# Patient Record
Sex: MALE | Race: WHITE | NOT HISPANIC OR LATINO | URBAN - NONMETROPOLITAN AREA
[De-identification: names, ages, dates, MRNs, and addresses within clinical notes are randomized per-mention and may not be internally consistent; named-entity substitution may affect disease eponyms.]

---

## 2023-10-06 ENCOUNTER — HOSPITAL ENCOUNTER (OUTPATIENT)
Dept: LAB | Facility: MEDICAL CENTER | Age: 63
End: 2023-10-06
Attending: STUDENT IN AN ORGANIZED HEALTH CARE EDUCATION/TRAINING PROGRAM
Payer: COMMERCIAL

## 2023-10-06 LAB
25(OH)D3 SERPL-MCNC: 66 NG/ML (ref 30–100)
ALBUMIN SERPL BCP-MCNC: 4.2 G/DL (ref 3.2–4.9)
ALP SERPL-CCNC: 104 U/L (ref 30–99)
ALT SERPL-CCNC: 21 U/L (ref 2–50)
AST SERPL-CCNC: 27 U/L (ref 12–45)
BASOPHILS # BLD AUTO: 0.9 % (ref 0–1.8)
BASOPHILS # BLD: 0.06 K/UL (ref 0–0.12)
BILIRUB CONJ SERPL-MCNC: <0.2 MG/DL (ref 0.1–0.5)
BILIRUB INDIRECT SERPL-MCNC: ABNORMAL MG/DL (ref 0–1)
BILIRUB SERPL-MCNC: 0.4 MG/DL (ref 0.1–1.5)
EOSINOPHIL # BLD AUTO: 0.09 K/UL (ref 0–0.51)
EOSINOPHIL NFR BLD: 1.3 % (ref 0–6.9)
ERYTHROCYTE [DISTWIDTH] IN BLOOD BY AUTOMATED COUNT: 49.4 FL (ref 35.9–50)
HCT VFR BLD AUTO: 46.4 % (ref 42–52)
HGB BLD-MCNC: 15 G/DL (ref 14–18)
IMM GRANULOCYTES # BLD AUTO: 0.04 K/UL (ref 0–0.11)
IMM GRANULOCYTES NFR BLD AUTO: 0.6 % (ref 0–0.9)
LYMPHOCYTES # BLD AUTO: 1.21 K/UL (ref 1–4.8)
LYMPHOCYTES NFR BLD: 17.6 % (ref 22–41)
MCH RBC QN AUTO: 30.1 PG (ref 27–33)
MCHC RBC AUTO-ENTMCNC: 32.3 G/DL (ref 32.3–36.5)
MCV RBC AUTO: 93.2 FL (ref 81.4–97.8)
MONOCYTES # BLD AUTO: 0.79 K/UL (ref 0–0.85)
MONOCYTES NFR BLD AUTO: 11.5 % (ref 0–13.4)
NEUTROPHILS # BLD AUTO: 4.7 K/UL (ref 1.82–7.42)
NEUTROPHILS NFR BLD: 68.1 % (ref 44–72)
NRBC # BLD AUTO: 0 K/UL
NRBC BLD-RTO: 0 /100 WBC (ref 0–0.2)
PLATELET # BLD AUTO: 241 K/UL (ref 164–446)
PMV BLD AUTO: 12.3 FL (ref 9–12.9)
PROT SERPL-MCNC: 7.2 G/DL (ref 6–8.2)
RBC # BLD AUTO: 4.98 M/UL (ref 4.7–6.1)
WBC # BLD AUTO: 6.9 K/UL (ref 4.8–10.8)

## 2023-10-06 PROCEDURE — 82784 ASSAY IGA/IGD/IGG/IGM EACH: CPT

## 2023-10-06 PROCEDURE — 86360 T CELL ABSOLUTE COUNT/RATIO: CPT

## 2023-10-06 PROCEDURE — 80076 HEPATIC FUNCTION PANEL: CPT

## 2023-10-06 PROCEDURE — 86357 NK CELLS TOTAL COUNT: CPT

## 2023-10-06 PROCEDURE — 82306 VITAMIN D 25 HYDROXY: CPT

## 2023-10-06 PROCEDURE — 85025 COMPLETE CBC W/AUTO DIFF WBC: CPT

## 2023-10-06 PROCEDURE — 36415 COLL VENOUS BLD VENIPUNCTURE: CPT

## 2023-10-06 PROCEDURE — 86359 T CELLS TOTAL COUNT: CPT

## 2023-10-06 PROCEDURE — 86355 B CELLS TOTAL COUNT: CPT

## 2023-10-08 LAB
ANNOTATION COMMENT IMP: ABNORMAL
CD19 CELLS NFR SPEC: 0 % (ref 6–23)
CD3 CELLS # BLD: 1076 CELLS/UL (ref 570–2400)
CD3 CELLS NFR SPEC: 75 % (ref 62–87)
CD3+CD4+ CELLS # BLD: 945 CELLS/UL (ref 430–1800)
CD3+CD4+ CELLS NFR BLD: 65 % (ref 32–64)
CD3+CD4+ CELLS/CD3+CD8+ CLL BLD: 7.22 RATIO (ref 0.8–3.9)
CD3+CD8+ CELLS # BLD: 126 CELLS/UL (ref 210–1200)
CD3+CD8+ CELLS NFR SPEC: 9 % (ref 15–46)
CD3-CD16+CD56+ CELLS # SPEC: 360 CELLS/UL (ref 78–470)
CD3-CD16+CD56+ CELLS NFR SPEC: 25 % (ref 4–26)
CELLS.CD3-CD19+ [#/VOLUME] IN BLOOD: 0 CELLS/UL (ref 91–610)
IGA SERPL-MCNC: 385 MG/DL (ref 68–408)
IGG SERPL-MCNC: 1113 MG/DL (ref 768–1632)
IGM SERPL-MCNC: 60 MG/DL (ref 35–263)

## 2023-10-30 SDOH — ECONOMIC STABILITY: TRANSPORTATION INSECURITY
IN THE PAST 12 MONTHS, HAS THE LACK OF TRANSPORTATION KEPT YOU FROM MEDICAL APPOINTMENTS OR FROM GETTING MEDICATIONS?: NO

## 2023-10-30 SDOH — ECONOMIC STABILITY: TRANSPORTATION INSECURITY
IN THE PAST 12 MONTHS, HAS LACK OF TRANSPORTATION KEPT YOU FROM MEETINGS, WORK, OR FROM GETTING THINGS NEEDED FOR DAILY LIVING?: NO

## 2023-10-30 SDOH — ECONOMIC STABILITY: FOOD INSECURITY: WITHIN THE PAST 12 MONTHS, YOU WORRIED THAT YOUR FOOD WOULD RUN OUT BEFORE YOU GOT MONEY TO BUY MORE.: NEVER TRUE

## 2023-10-30 SDOH — ECONOMIC STABILITY: FOOD INSECURITY: WITHIN THE PAST 12 MONTHS, THE FOOD YOU BOUGHT JUST DIDN'T LAST AND YOU DIDN'T HAVE MONEY TO GET MORE.: NEVER TRUE

## 2023-10-30 SDOH — HEALTH STABILITY: MENTAL HEALTH
STRESS IS WHEN SOMEONE FEELS TENSE, NERVOUS, ANXIOUS, OR CAN'T SLEEP AT NIGHT BECAUSE THEIR MIND IS TROUBLED. HOW STRESSED ARE YOU?: TO SOME EXTENT

## 2023-10-30 SDOH — HEALTH STABILITY: PHYSICAL HEALTH: ON AVERAGE, HOW MANY DAYS PER WEEK DO YOU ENGAGE IN MODERATE TO STRENUOUS EXERCISE (LIKE A BRISK WALK)?: 0 DAYS

## 2023-10-30 SDOH — ECONOMIC STABILITY: HOUSING INSECURITY
IN THE LAST 12 MONTHS, WAS THERE A TIME WHEN YOU DID NOT HAVE A STEADY PLACE TO SLEEP OR SLEPT IN A SHELTER (INCLUDING NOW)?: NO

## 2023-10-30 SDOH — HEALTH STABILITY: PHYSICAL HEALTH: ON AVERAGE, HOW MANY MINUTES DO YOU ENGAGE IN EXERCISE AT THIS LEVEL?: 0 MIN

## 2023-10-30 SDOH — ECONOMIC STABILITY: TRANSPORTATION INSECURITY
IN THE PAST 12 MONTHS, HAS LACK OF RELIABLE TRANSPORTATION KEPT YOU FROM MEDICAL APPOINTMENTS, MEETINGS, WORK OR FROM GETTING THINGS NEEDED FOR DAILY LIVING?: NO

## 2023-10-30 SDOH — ECONOMIC STABILITY: INCOME INSECURITY: HOW HARD IS IT FOR YOU TO PAY FOR THE VERY BASICS LIKE FOOD, HOUSING, MEDICAL CARE, AND HEATING?: NOT HARD AT ALL

## 2023-10-30 SDOH — ECONOMIC STABILITY: INCOME INSECURITY: IN THE LAST 12 MONTHS, WAS THERE A TIME WHEN YOU WERE NOT ABLE TO PAY THE MORTGAGE OR RENT ON TIME?: NO

## 2023-10-30 SDOH — ECONOMIC STABILITY: HOUSING INSECURITY: IN THE LAST 12 MONTHS, HOW MANY PLACES HAVE YOU LIVED?: 2

## 2023-10-30 ASSESSMENT — SOCIAL DETERMINANTS OF HEALTH (SDOH)
HOW MANY DRINKS CONTAINING ALCOHOL DO YOU HAVE ON A TYPICAL DAY WHEN YOU ARE DRINKING: PATIENT DOES NOT DRINK
IN A TYPICAL WEEK, HOW MANY TIMES DO YOU TALK ON THE PHONE WITH FAMILY, FRIENDS, OR NEIGHBORS?: MORE THAN THREE TIMES A WEEK
HOW OFTEN DO YOU ATTENT MEETINGS OF THE CLUB OR ORGANIZATION YOU BELONG TO?: NEVER
WITHIN THE PAST 12 MONTHS, YOU WORRIED THAT YOUR FOOD WOULD RUN OUT BEFORE YOU GOT THE MONEY TO BUY MORE: NEVER TRUE
HOW OFTEN DO YOU ATTENT MEETINGS OF THE CLUB OR ORGANIZATION YOU BELONG TO?: NEVER
HOW OFTEN DO YOU ATTEND CHURCH OR RELIGIOUS SERVICES?: NEVER
HOW OFTEN DO YOU ATTEND CHURCH OR RELIGIOUS SERVICES?: NEVER
IN A TYPICAL WEEK, HOW MANY TIMES DO YOU TALK ON THE PHONE WITH FAMILY, FRIENDS, OR NEIGHBORS?: MORE THAN THREE TIMES A WEEK
HOW OFTEN DO YOU HAVE A DRINK CONTAINING ALCOHOL: NEVER
DO YOU BELONG TO ANY CLUBS OR ORGANIZATIONS SUCH AS CHURCH GROUPS UNIONS, FRATERNAL OR ATHLETIC GROUPS, OR SCHOOL GROUPS?: NO
HOW HARD IS IT FOR YOU TO PAY FOR THE VERY BASICS LIKE FOOD, HOUSING, MEDICAL CARE, AND HEATING?: NOT HARD AT ALL
DO YOU BELONG TO ANY CLUBS OR ORGANIZATIONS SUCH AS CHURCH GROUPS UNIONS, FRATERNAL OR ATHLETIC GROUPS, OR SCHOOL GROUPS?: NO
HOW OFTEN DO YOU GET TOGETHER WITH FRIENDS OR RELATIVES?: ONCE A WEEK
HOW OFTEN DO YOU GET TOGETHER WITH FRIENDS OR RELATIVES?: ONCE A WEEK
HOW OFTEN DO YOU HAVE SIX OR MORE DRINKS ON ONE OCCASION: NEVER

## 2023-10-30 ASSESSMENT — LIFESTYLE VARIABLES
AUDIT-C TOTAL SCORE: 0
SKIP TO QUESTIONS 9-10: 1
HOW OFTEN DO YOU HAVE A DRINK CONTAINING ALCOHOL: NEVER
HOW MANY STANDARD DRINKS CONTAINING ALCOHOL DO YOU HAVE ON A TYPICAL DAY: PATIENT DOES NOT DRINK
HOW OFTEN DO YOU HAVE SIX OR MORE DRINKS ON ONE OCCASION: NEVER

## 2023-11-01 ENCOUNTER — OFFICE VISIT (OUTPATIENT)
Dept: MEDICAL GROUP | Facility: LAB | Age: 63
End: 2023-11-01
Payer: COMMERCIAL

## 2023-11-01 VITALS
BODY MASS INDEX: 30.61 KG/M2 | HEART RATE: 60 BPM | WEIGHT: 213.8 LBS | SYSTOLIC BLOOD PRESSURE: 126 MMHG | RESPIRATION RATE: 16 BRPM | OXYGEN SATURATION: 98 % | DIASTOLIC BLOOD PRESSURE: 58 MMHG | TEMPERATURE: 97.3 F | HEIGHT: 70 IN

## 2023-11-01 DIAGNOSIS — Z00.00 HEALTHCARE MAINTENANCE: ICD-10-CM

## 2023-11-01 DIAGNOSIS — Z12.11 COLON CANCER SCREENING: ICD-10-CM

## 2023-11-01 DIAGNOSIS — G47.01 INSOMNIA DUE TO MEDICAL CONDITION: ICD-10-CM

## 2023-11-01 DIAGNOSIS — E55.9 HYPOVITAMINOSIS D: ICD-10-CM

## 2023-11-01 DIAGNOSIS — R39.12 BENIGN PROSTATIC HYPERPLASIA WITH WEAK URINARY STREAM: ICD-10-CM

## 2023-11-01 DIAGNOSIS — N40.1 BENIGN PROSTATIC HYPERPLASIA WITH WEAK URINARY STREAM: ICD-10-CM

## 2023-11-01 DIAGNOSIS — G35 MULTIPLE SCLEROSIS (HCC): ICD-10-CM

## 2023-11-01 PROCEDURE — 3074F SYST BP LT 130 MM HG: CPT | Performed by: STUDENT IN AN ORGANIZED HEALTH CARE EDUCATION/TRAINING PROGRAM

## 2023-11-01 PROCEDURE — 99204 OFFICE O/P NEW MOD 45 MIN: CPT | Performed by: STUDENT IN AN ORGANIZED HEALTH CARE EDUCATION/TRAINING PROGRAM

## 2023-11-01 PROCEDURE — 3078F DIAST BP <80 MM HG: CPT | Performed by: STUDENT IN AN ORGANIZED HEALTH CARE EDUCATION/TRAINING PROGRAM

## 2023-11-01 RX ORDER — TAMSULOSIN HYDROCHLORIDE 0.4 MG/1
0.4 CAPSULE ORAL
Qty: 30 CAPSULE | Refills: 1 | Status: SHIPPED | OUTPATIENT
Start: 2023-11-01 | End: 2023-12-12

## 2023-11-01 RX ORDER — OFATUMUMAB 20 MG/.4ML
INJECTION, SOLUTION SUBCUTANEOUS
COMMUNITY

## 2023-11-01 RX ORDER — ERGOCALCIFEROL 1.25 MG/1
50000 CAPSULE ORAL
Qty: 12 CAPSULE | Refills: 2 | Status: SHIPPED | OUTPATIENT
Start: 2023-11-01

## 2023-11-01 ASSESSMENT — ENCOUNTER SYMPTOMS
FEVER: 0
CHILLS: 0
SHORTNESS OF BREATH: 0

## 2023-11-01 ASSESSMENT — FIBROSIS 4 INDEX: FIB4 SCORE: 1.49

## 2023-11-01 ASSESSMENT — PATIENT HEALTH QUESTIONNAIRE - PHQ9: CLINICAL INTERPRETATION OF PHQ2 SCORE: 0

## 2023-11-01 NOTE — LETTER
[employer]  Yonas Chou D.O.  [address] [facility] [address]  [place] [address]  Fax: [phone]   Authorization for Release/Disclosure of   Protected Health Information   Name: AMINAH MCKAY : 1960 SSN: [id number]   Address: [address] [place] [address] [address] [place] [address] Phone:    [phone] (home)    I authorize the entity listed below to release/disclose the PHI below to:   [facility]/Yonas Chou D.O. and Yonas Chou D.O.   Provider or Entity Name:  Dr. Jf Mccrary MD   Address   [facility]  [address] [place] [address] Phone:  [phone]    Fax:  [phone]   Reason for request: continuity of care   Information to be released:    [  ] LAST COLONOSCOPY,  including any PATH REPORT and follow-up  [  ] LAST FIT/COLOGUARD RESULT [  ] LAST DEXA  [  ] LAST MAMMOGRAM  [  ] LAST PAP  [  ] LAST LABS [  ] RETINA EXAM REPORT  [  ] IMMUNIZATION RECORDS  [ XX ] Release all info: last MRI and notes      [  ] Check here and initial the line next to each item to release ALL health information INCLUDING  _____ Care and treatment for drug and / or alcohol abuse  _____ HIV testing, infection status, or AIDS  _____ Genetic Testing    DATES OF SERVICE OR TIME PERIOD TO BE DISCLOSED: _____________  I understand and acknowledge that:  * This Authorization may be revoked at any time by you in writing, except if your health information has already been used or disclosed.  * Your health information that will be used or disclosed as a result of you signing this authorization could be re-disclosed by the recipient. If this occurs, your re-disclosed health information may no longer be protected by State or Federal laws.  * You may refuse to sign this Authorization. Your refusal will not affect your ability to obtain treatment.  * This Authorization becomes effective upon signing and will  on (date) __________.      If no date is indicated, this Authorization will  one (1) year from the signature  date.    Name: Brian Hogan  Signature:continuity of care Date:   11/1/2023     PLEASE FAX REQUESTED RECORDS BACK TO: [phone]

## 2023-11-01 NOTE — PROGRESS NOTES
"Subjective:     CC:  Diagnoses of Colon cancer screening, Multiple sclerosis (HCC), Hypovitaminosis D, Benign prostatic hyperplasia with weak urinary stream, Insomnia due to medical condition, and Healthcare maintenance were pertinent to this visit.    HISTORY OF THE PRESENT ILLNESS: Patient is a 63 y.o. male with the following medical problems   Past Medical History:   Diagnosis Date    Multiple serositis (HCC)      . This pleasant patient is here today to establish care and discuss the following;    Problem   Multiple Sclerosis (Hcc)    -Dx in 2015 when he had numbess in his lower half of his body  -the weakness started roughly in 2016 with progression  -he was using a cane then a walker and now mechanical wheelchair    -he is currently on kesimpta. He was following Dr. Mayra Mccrary in [facility]. He has tried 4 different medications in the past.     Hypovitaminosis D   Insomnia Due to Medical Condition    Likely related to bph         Current Outpatient Medications Ordered in Epic   Medication Sig Dispense Refill    Ofatumumab (KESIMPTA) 20 MG/0.4ML Solution Auto-injector Inject  under the skin.      vitamin D2, Ergocalciferol, (DRISDOL) 1.25 MG (21310 UT) Cap capsule Take 1 Capsule by mouth every 7 days. 12 Capsule 2    tamsulosin (FLOMAX) 0.4 MG capsule Take 1 Capsule by mouth 1/2 hour after breakfast. 30 Capsule 1     No current Epic-ordered facility-administered medications on file.         ROS:   Review of Systems   Constitutional:  Negative for chills and fever.   Respiratory:  Negative for shortness of breath.    Cardiovascular:  Negative for chest pain.         Objective:     Exam: /58 (BP Location: Right arm, Patient Position: Sitting, BP Cuff Size: Adult)   Pulse 60   Temp 36.3 °C (97.3 °F)   Resp 16   Ht 1.778 m (5' 10\")   Wt 97 kg (213 lb 12.8 oz)   SpO2 98%  Body mass index is 30.68 kg/m².    Physical Exam  Constitutional:       General: He is not in acute distress.     " Appearance: He is not ill-appearing.   Pulmonary:      Effort: Pulmonary effort is normal.   Neurological:      Mental Status: He is alert.   Psychiatric:         Mood and Affect: Mood normal.         Behavior: Behavior normal.         Thought Content: Thought content normal.         Judgment: Judgment normal.               Assessment & Plan:     Problem List Items Addressed This Visit       Hypovitaminosis D    Relevant Medications    vitamin D2, Ergocalciferol, (DRISDOL) 1.25 MG (27690 UT) Cap capsule    Insomnia due to medical condition     Chronic  -will try flomax to prevent nocturia          Multiple sclerosis (HCC)     Chronic  -refer to neurology          Relevant Medications    Ofatumumab (KESIMPTA) 20 MG/0.4ML Solution Auto-injector    Other Relevant Orders    Referral to Neurology     Other Visit Diagnoses       Colon cancer screening        Relevant Orders    Referral to GI for Colonoscopy    Benign prostatic hyperplasia with weak urinary stream        Relevant Medications    tamsulosin (FLOMAX) 0.4 MG capsule    Healthcare maintenance        Relevant Orders    Lipid Profile    HEMOGLOBIN A1C    TSH WITH REFLEX TO FT4    Comp Metabolic Panel            3 month annual     Please note that this dictation was created using voice recognition software. I have made every reasonable attempt to correct obvious errors, but I expect that there are errors of grammar and possibly content that I did not discover before finalizing the note.

## 2023-11-03 ENCOUNTER — TELEPHONE (OUTPATIENT)
Dept: SCHEDULING | Facility: IMAGING CENTER | Age: 63
End: 2023-11-03
Payer: COMMERCIAL

## 2023-11-03 NOTE — TELEPHONE ENCOUNTER
Isreal Vyas has a ref to Dr.Bloch    Patient Name: Brian Hogan  MRN:[id number]  Referral #:[id number]  Referred By:EMILIANO DALTON  Best Call Back #:[phone]    Thanks.

## 2023-12-09 DIAGNOSIS — R39.12 BENIGN PROSTATIC HYPERPLASIA WITH WEAK URINARY STREAM: ICD-10-CM

## 2023-12-09 DIAGNOSIS — N40.1 BENIGN PROSTATIC HYPERPLASIA WITH WEAK URINARY STREAM: ICD-10-CM

## 2023-12-12 RX ORDER — TAMSULOSIN HYDROCHLORIDE 0.4 MG/1
0.4 CAPSULE ORAL
Qty: 90 CAPSULE | Refills: 1 | Status: SHIPPED | OUTPATIENT
Start: 2023-12-12 | End: 2024-02-01 | Stop reason: SDUPTHER

## 2024-01-17 ENCOUNTER — OFFICE VISIT (OUTPATIENT)
Dept: NEUROLOGY | Facility: MEDICAL CENTER | Age: 64
End: 2024-01-17
Attending: PSYCHIATRY & NEUROLOGY
Payer: COMMERCIAL

## 2024-01-17 VITALS
BODY MASS INDEX: 30.06 KG/M2 | DIASTOLIC BLOOD PRESSURE: 62 MMHG | WEIGHT: 210 LBS | HEART RATE: 60 BPM | HEIGHT: 70 IN | SYSTOLIC BLOOD PRESSURE: 110 MMHG | TEMPERATURE: 98 F | OXYGEN SATURATION: 98 %

## 2024-01-17 DIAGNOSIS — G35 MULTIPLE SCLEROSIS (HCC): Primary | ICD-10-CM

## 2024-01-17 PROCEDURE — 3078F DIAST BP <80 MM HG: CPT | Performed by: PSYCHIATRY & NEUROLOGY

## 2024-01-17 PROCEDURE — 99417 PROLNG OP E/M EACH 15 MIN: CPT | Performed by: PSYCHIATRY & NEUROLOGY

## 2024-01-17 PROCEDURE — 99211 OFF/OP EST MAY X REQ PHY/QHP: CPT | Performed by: PSYCHIATRY & NEUROLOGY

## 2024-01-17 PROCEDURE — 99205 OFFICE O/P NEW HI 60 MIN: CPT | Performed by: PSYCHIATRY & NEUROLOGY

## 2024-01-17 PROCEDURE — 3074F SYST BP LT 130 MM HG: CPT | Performed by: PSYCHIATRY & NEUROLOGY

## 2024-01-17 ASSESSMENT — PATIENT HEALTH QUESTIONNAIRE - PHQ9: CLINICAL INTERPRETATION OF PHQ2 SCORE: 0

## 2024-01-17 ASSESSMENT — FIBROSIS 4 INDEX: FIB4 SCORE: 1.49

## 2024-01-17 NOTE — PROGRESS NOTES
"[facility] NEUROLOGY  MULTIPLE SCLEROSIS & NEUROIMMUNOLOGY  NEW PATIENT VISIT    Referral source: Yonas GuamanDO    DISEASE SUMMARY:  Principal neurologic diagnosis: MS  Diagnosis of MS: 2016  Disease History:  - 2015: episode of numbness with a sensory level at the level of the diaphragm; resolved spontaneously after ~1 week  - 2016: second episode of numbness with same sensory level as previous, resolved spontaneously  - established w/ Dr. Yoo; workup included MRIs and LP; diagnosed w/ MS  - established w/ Dr. Lancaster (MS specialist)  - 2017: started Tecfidera;   - 2017: stopped Tecfidera due to perceived ineffectiveness (progression)  - 2018: started Ocrevus, treated x1 year  - treated w/ Acthar Gel  - 2019: transitioned to Tysabri due to \"no change or improvement\" on Ocrevus  - : required a cane  - Dr. Lancaster , eventually transitioned to Dr. Mayra Mccrary ([facility])  - : started using a walker occasionally  - : transitioned to Kesimpta due to \"no change or improvement\" on Tysabri  - : got a power chair due to fatigability while walking with walker  Disease course at onset: ?relapsing or progressive  Current disease course: progressive (?primary or secondary) without activity without worsening  Previous disease therapies:  - Tecfidera:   Current disease therapies:  - Kesimpta: tolerates this well  Symptomatic therapies:  - Ampyra: tried this briefly without noticeable benefit, longer courses never approved by insurance  - Flomax: minimally helpful for nocturia  CSF ():  - reportedly consistent with MS  Other Testing:  -   MRI head:  - 2023: \"unchanged white matter lesions... no new lesion... no lesions enhance...\"  - 2022:   MRI cervical spine:  -   MRI thoracic spine:  - 2023: \"unchanged cord lesions... no new lesion...\"  - 2022:   Immunizations:  - influenza?:   - Pneumonia?:   - SARS-CoV-2?: original Moderna in   Cancer Screens:  - mammogram:   - PAP?:   - " "skin check:     CC: \"MS\"    HISTORY OF ILLNESS:  Brian Hogan is a 63 y.o. man with a history most notable for MS.  Today, he was accompanied by his wife (Etta), and he provided the following history:    I have summarized pertinent data above.    A review of MS-related symptoms was notable for the following:    Fatigue: yes; snoring has improved with weight loss, no apneic episodes per wife  Weakness: yes; lower extremities and RUE  Numbness: yes; involving the ulnar distribution of RUE  Incoordination: no  Spasms/Spasticity: yes; in the lower extremities, symptoms are not so severe that he would consider medication  Vision Impairment: yes; related to \"old age\"  Walking/Balance Problems: yes; discussed above  Neuralgia: no  Bowel Symptoms: no  Bladder Symptoms: yes; frequency: every 3-4 hours, urgency: yes, hesitancy: no, incomplete emptying: yes, incontinence: no, has never seen a urologist  Heat Sensitivity: yes; lower extremity weakness  Depression: no  Cognitive/Memory Problems: no  Sexual Dysfunction:  not assessed  Anxiety: no    MEDICAL AND SURGICAL HISTORY:  Past Medical History:   Diagnosis Date    Multiple serositis (HCC)      History reviewed. No pertinent surgical history.  MEDICATIONS:  Current Outpatient Medications   Medication Sig    tamsulosin (FLOMAX) 0.4 MG capsule TAKE 1 CAPSULE BY MOUTH 30 MINUTES AFTER BREAKFAST    Ofatumumab (KESIMPTA) 20 MG/0.4ML Solution Auto-injector Inject  under the skin.    vitamin D2, Ergocalciferol, (DRISDOL) 1.25 MG (19299 UT) Cap capsule Take 1 Capsule by mouth every 7 days.     SOCIAL HISTORY:  Social History     Tobacco Use    Smoking status: Never    Smokeless tobacco: Never   Substance Use Topics    Alcohol use: Not Currently     Social History     Social History Narrative    Not on file     FAMILY HISTORY:  Family History   Problem Relation Age of Onset    No Known Problems Mother     Heart Disease Father     Leukemia Father     Leukemia Brother      REVIEW " OF SYSTEMS:  A ROS was completed.  Pertinent positives and negatives were included in the HPI, above.  All other systems were reviewed and are negative.    PHYSICAL EXAM:  General/Medical:  - NAD    Neuro:  MENTAL STATUS: awake and alert; no deficits of speech or language; oriented to conversation; affect was appropriate to situation; pleasant, cooperative    CRANIAL NERVES:    II: acuity: J2/J7 without glasses, fields: intact to confrontation, pupils: 5/5 to 3/3 without a relative afferent pupillary defect, discs: sharp, no red desaturation noted    III/IV/VI: versions: intact without nystagmus    V: facial sensation: symmetric to light touch    VII: facial expression: symmetric    VIII: hearing: intact to finger rub    IX/X: palate: elevates symmetrically    XI: shoulder shrug: symmetric    XII: tongue: midline    MOTOR:  - bulk: normal throughout  - tone: NT  Upper Extremity Strength  (R/L)    5/5   Elbow flexion 5/5   Elbow extension 5/5   Shoulder abduction 5/5     Lower Extremity Strength  (R/L)   Hip flexion <3/<3+   Knee extension 4/4+   Knee flexion NT   Ankle dorsiflexion <3/4   Ankle plantarflexion NT     - heel-walk: NT  - toe-walk: NT  - pronator drift: absent  - abnormal movements: none    SENSATION:  - light touch: reduced in the RUE (respecting an ulnar nerve distribution) and the BLEs circumferentially  - vibration (R/L, seconds): 0/0 at the great toes  - pinprick: NT  - proprioception: NT  - Romberg: NT    COORDINATION:  - finger to nose: normal, no ataxia on exam  - finger tapping: rapid and accurate, bilaterally    REFLEXES:  Reflex Right Left   BR 1+ 2+   Biceps 1+ 2+   Triceps NT NT   Patellae 2+ 2+   Achilles 1+ 1+   Toes up up     GAIT:  - walker  - slow  - heel-walk: NT  - toe-walk: NT  - tandem gait: NT    QUANTITATIVE SCORES:  Timed 25-foot walk (sec): 17 on 1/17/2024.  Assistive device: none    REVIEW OF IMAGING STUDIES:  I have summarized available results above.  I have asked my  "staff to obtain copies of prior images.    REVIEW OF LABORATORY STUDIES:  I have summarized pertinent data above.    ASSESSMENT:  Brian Hogan is a 63 y.o. man with progressive MS without activity with worsening.  Plans/recommendations as follows:    PLAN:  Progressive MS Without Activity With Worsening:  - continue Kesimpta  - Kesimpta pre-treatment labs (for the sake of thoroughness)  - Prevnar 20 vaccination  - NCS BUEs (lower-motor neuron findings in the RUE)  - will discuss Ampyra at the next visit    Follow-Up:  - Return in about 4 months (around 5/17/2024).    Signed: Deandre Hull M.D.    BILLING DOCUMENTATION:   I spent 90 minutes reviewing the medical record, interviewing and examining the patient, discussing my impression (see \"assessment\" above), and coordinating care.  "

## 2024-01-23 ENCOUNTER — HOSPITAL ENCOUNTER (OUTPATIENT)
Dept: LAB | Facility: MEDICAL CENTER | Age: 64
End: 2024-01-23
Attending: PSYCHIATRY & NEUROLOGY
Payer: COMMERCIAL

## 2024-01-23 ENCOUNTER — HOSPITAL ENCOUNTER (OUTPATIENT)
Dept: LAB | Facility: MEDICAL CENTER | Age: 64
End: 2024-01-23
Attending: STUDENT IN AN ORGANIZED HEALTH CARE EDUCATION/TRAINING PROGRAM
Payer: COMMERCIAL

## 2024-01-23 DIAGNOSIS — Z00.00 HEALTHCARE MAINTENANCE: ICD-10-CM

## 2024-01-23 DIAGNOSIS — G35 MULTIPLE SCLEROSIS (HCC): ICD-10-CM

## 2024-01-23 LAB
ALBUMIN SERPL BCP-MCNC: 4.2 G/DL (ref 3.2–4.9)
ALBUMIN/GLOB SERPL: 1.3 G/DL
ALP SERPL-CCNC: 83 U/L (ref 30–99)
ALT SERPL-CCNC: 15 U/L (ref 2–50)
ANION GAP SERPL CALC-SCNC: 9 MMOL/L (ref 7–16)
AST SERPL-CCNC: 19 U/L (ref 12–45)
BILIRUB SERPL-MCNC: 0.3 MG/DL (ref 0.1–1.5)
BUN SERPL-MCNC: 13 MG/DL (ref 8–22)
CALCIUM ALBUM COR SERPL-MCNC: 8.6 MG/DL (ref 8.5–10.5)
CALCIUM SERPL-MCNC: 8.8 MG/DL (ref 8.5–10.5)
CHLORIDE SERPL-SCNC: 101 MMOL/L (ref 96–112)
CHOLEST SERPL-MCNC: 304 MG/DL (ref 100–199)
CO2 SERPL-SCNC: 27 MMOL/L (ref 20–33)
CREAT SERPL-MCNC: 0.81 MG/DL (ref 0.5–1.4)
EST. AVERAGE GLUCOSE BLD GHB EST-MCNC: 120 MG/DL
FASTING STATUS PATIENT QL REPORTED: NORMAL
GFR SERPLBLD CREATININE-BSD FMLA CKD-EPI: 100 ML/MIN/1.73 M 2
GLOBULIN SER CALC-MCNC: 3.2 G/DL (ref 1.9–3.5)
GLUCOSE SERPL-MCNC: 95 MG/DL (ref 65–99)
HBA1C MFR BLD: 5.8 % (ref 4–5.6)
HBV SURFACE AB SERPL IA-ACNC: <3.5 MIU/ML (ref 0–10)
HBV SURFACE AG SER QL: NORMAL
HCV AB SER QL: NORMAL
HDLC SERPL-MCNC: 52 MG/DL
LDLC SERPL CALC-MCNC: 238 MG/DL
POTASSIUM SERPL-SCNC: 4 MMOL/L (ref 3.6–5.5)
PROT SERPL-MCNC: 7.4 G/DL (ref 6–8.2)
SODIUM SERPL-SCNC: 137 MMOL/L (ref 135–145)
TRIGL SERPL-MCNC: 69 MG/DL (ref 0–149)
TSH SERPL DL<=0.005 MIU/L-ACNC: 2.48 UIU/ML (ref 0.38–5.33)

## 2024-01-23 PROCEDURE — 83036 HEMOGLOBIN GLYCOSYLATED A1C: CPT

## 2024-01-23 PROCEDURE — 84443 ASSAY THYROID STIM HORMONE: CPT

## 2024-01-23 PROCEDURE — 86706 HEP B SURFACE ANTIBODY: CPT

## 2024-01-23 PROCEDURE — 86480 TB TEST CELL IMMUN MEASURE: CPT

## 2024-01-23 PROCEDURE — 80061 LIPID PANEL: CPT

## 2024-01-23 PROCEDURE — 87340 HEPATITIS B SURFACE AG IA: CPT

## 2024-01-23 PROCEDURE — 86787 VARICELLA-ZOSTER ANTIBODY: CPT

## 2024-01-23 PROCEDURE — 86803 HEPATITIS C AB TEST: CPT

## 2024-01-23 PROCEDURE — 36415 COLL VENOUS BLD VENIPUNCTURE: CPT

## 2024-01-23 PROCEDURE — 80053 COMPREHEN METABOLIC PANEL: CPT

## 2024-01-25 LAB
GAMMA INTERFERON BACKGROUND BLD IA-ACNC: 0.03 IU/ML
M TB IFN-G BLD-IMP: NEGATIVE
M TB IFN-G CD4+ BCKGRND COR BLD-ACNC: 0 IU/ML
MITOGEN IGNF BCKGRD COR BLD-ACNC: 4.4 IU/ML
QFT TB2 - NIL TBQ2: 0 IU/ML
VZV IGG SER IA-ACNC: 2427 IV

## 2024-02-01 ENCOUNTER — OFFICE VISIT (OUTPATIENT)
Dept: MEDICAL GROUP | Facility: LAB | Age: 64
End: 2024-02-01
Payer: COMMERCIAL

## 2024-02-01 VITALS
WEIGHT: 210 LBS | SYSTOLIC BLOOD PRESSURE: 118 MMHG | TEMPERATURE: 97.5 F | OXYGEN SATURATION: 94 % | HEIGHT: 70 IN | HEART RATE: 64 BPM | DIASTOLIC BLOOD PRESSURE: 62 MMHG | RESPIRATION RATE: 16 BRPM | BODY MASS INDEX: 30.06 KG/M2

## 2024-02-01 DIAGNOSIS — R39.12 BENIGN PROSTATIC HYPERPLASIA WITH WEAK URINARY STREAM: ICD-10-CM

## 2024-02-01 DIAGNOSIS — E78.5 DYSLIPIDEMIA: ICD-10-CM

## 2024-02-01 DIAGNOSIS — N40.1 BENIGN PROSTATIC HYPERPLASIA WITH WEAK URINARY STREAM: ICD-10-CM

## 2024-02-01 PROCEDURE — 3078F DIAST BP <80 MM HG: CPT | Performed by: STUDENT IN AN ORGANIZED HEALTH CARE EDUCATION/TRAINING PROGRAM

## 2024-02-01 PROCEDURE — 99396 PREV VISIT EST AGE 40-64: CPT | Performed by: STUDENT IN AN ORGANIZED HEALTH CARE EDUCATION/TRAINING PROGRAM

## 2024-02-01 PROCEDURE — 3074F SYST BP LT 130 MM HG: CPT | Performed by: STUDENT IN AN ORGANIZED HEALTH CARE EDUCATION/TRAINING PROGRAM

## 2024-02-01 RX ORDER — TAMSULOSIN HYDROCHLORIDE 0.4 MG/1
0.4 CAPSULE ORAL
Qty: 90 CAPSULE | Refills: 1 | Status: SHIPPED | OUTPATIENT
Start: 2024-02-01

## 2024-02-01 RX ORDER — ROSUVASTATIN CALCIUM 5 MG/1
5 TABLET, COATED ORAL EVERY EVENING
Qty: 30 TABLET | Refills: 1 | Status: SHIPPED | OUTPATIENT
Start: 2024-02-01 | End: 2024-03-06

## 2024-02-01 ASSESSMENT — ENCOUNTER SYMPTOMS
SHORTNESS OF BREATH: 0
FEVER: 0
CHILLS: 0

## 2024-02-01 ASSESSMENT — FIBROSIS 4 INDEX: FIB4 SCORE: 1.24

## 2024-02-01 NOTE — PROGRESS NOTES
Subjective:     Subjective:     CC:   Chief Complaint   Patient presents with    Annual Exam     Review labs       HPI:   Brian Hogan is a 63 y.o. male who presents for annual exam    Last Colorectal Cancer Screening: ordered previously   Last Tdap: unable to recall  Received HPV series: No  Hx STDs: No    Exercise: 30 minutes of exercise daily   Diet: home cooked meals, with fruits and vegetables     He  has a past medical history of Multiple serositis (HCC).  He  has no past surgical history on file.    Family History   Problem Relation Age of Onset    No Known Problems Mother     Heart Disease Father     Leukemia Father     Leukemia Brother      Social History     Tobacco Use    Smoking status: Never    Smokeless tobacco: Never   Vaping Use    Vaping Use: Never used   Substance Use Topics    Alcohol use: Not Currently    Drug use: Never     He  has no history on file for sexual activity.    Patient Active Problem List    Diagnosis Date Noted    Dyslipidemia 02/01/2024    Multiple sclerosis (HCC) 11/01/2023    Hypovitaminosis D 11/01/2023    Insomnia due to medical condition 11/01/2023     Current Outpatient Medications   Medication Sig Dispense Refill    rosuvastatin (CRESTOR) 5 MG Tab Take 1 Tablet by mouth every evening. 30 Tablet 1    tamsulosin (FLOMAX) 0.4 MG capsule Take 1 Capsule by mouth 1/2 hour after breakfast. 90 Capsule 1    Ofatumumab (KESIMPTA) 20 MG/0.4ML Solution Auto-injector Inject  under the skin.      vitamin D2, Ergocalciferol, (DRISDOL) 1.25 MG (52941 UT) Cap capsule Take 1 Capsule by mouth every 7 days. 12 Capsule 2     No current facility-administered medications for this visit.     Not on File    Review of Systems   Review of Systems   Constitutional:  Negative for chills and fever.   Respiratory:  Negative for shortness of breath.    Cardiovascular:  Negative for chest pain.        Objective:   /62 (BP Location: Left arm, Patient Position: Sitting, BP Cuff Size: Adult)   Pulse  "64   Temp 36.4 °C (97.5 °F)   Resp 16   Ht 1.778 m (5' 10\")   Wt 95.3 kg (210 lb)   SpO2 94%   BMI 30.13 kg/m²      Wt Readings from Last 4 Encounters:   02/01/24 95.3 kg (210 lb)   01/17/24 95.3 kg (210 lb)   11/01/23 97 kg (213 lb 12.8 oz)           Physical Exam:  Physical Exam  Constitutional:       General: He is not in acute distress.     Appearance: Normal appearance. He is not ill-appearing.   HENT:      Head: Normocephalic and atraumatic.      Right Ear: Tympanic membrane and ear canal normal.      Left Ear: Tympanic membrane and ear canal normal.      Mouth/Throat:      Mouth: Mucous membranes are moist.      Pharynx: Oropharynx is clear.   Eyes:      Extraocular Movements: Extraocular movements intact.      Pupils: Pupils are equal, round, and reactive to light.   Neck:      Thyroid: No thyromegaly.   Cardiovascular:      Rate and Rhythm: Normal rate and regular rhythm.      Heart sounds: Normal heart sounds.   Pulmonary:      Effort: Pulmonary effort is normal.      Breath sounds: Normal breath sounds.   Abdominal:      General: Abdomen is flat. Bowel sounds are normal.      Palpations: Abdomen is soft.   Musculoskeletal:      Cervical back: Normal range of motion and neck supple.      Right lower leg: No edema.      Left lower leg: No edema.   Lymphadenopathy:      Cervical: No cervical adenopathy.   Skin:     General: Skin is warm and dry.   Neurological:      Mental Status: He is alert.   Psychiatric:         Mood and Affect: Mood normal.         Behavior: Behavior normal.         Thought Content: Thought content normal.         Judgment: Judgment normal.             Assessment and Plan:     Problem List Items Addressed This Visit       Dyslipidemia     New diagnosis   -will start low dose rosuvastatin per patient preference          Relevant Medications    rosuvastatin (CRESTOR) 5 MG Tab    Other Relevant Orders    Lipid Profile    Comp Metabolic Panel     Other Visit Diagnoses       Benign " prostatic hyperplasia with weak urinary stream        Relevant Medications    tamsulosin (FLOMAX) 0.4 MG capsule             Health maintenance:    Labs per orders  Immunizations per orders  Age-appropriate industry guidance discussed including diet and exercise  Discussed diet and exercise     Follow-up: June blood work f/u

## 2024-03-06 DIAGNOSIS — E78.5 DYSLIPIDEMIA: ICD-10-CM

## 2024-03-06 RX ORDER — ROSUVASTATIN CALCIUM 5 MG/1
5 TABLET, COATED ORAL EVERY EVENING
Qty: 90 TABLET | Refills: 2 | Status: SHIPPED | OUTPATIENT
Start: 2024-03-06

## 2024-03-06 NOTE — TELEPHONE ENCOUNTER
Received request via: Pharmacy    Was the patient seen in the last year in this department? Yes    Does the patient have an active prescription (recently filled or refills available) for medication(s) requested? No    Pharm[facility] Name: [facility]     Does the patient have detention Plus and need 100 day supply (blood pressure, diabetes and cholesterol meds only)? Patient does not have SCP

## 2024-04-23 ENCOUNTER — NON-PROVIDER VISIT (OUTPATIENT)
Dept: NEUROLOGY | Facility: MEDICAL CENTER | Age: 64
End: 2024-04-23
Attending: SPECIALIST
Payer: COMMERCIAL

## 2024-04-23 DIAGNOSIS — G35 MULTIPLE SCLEROSIS (HCC): Primary | ICD-10-CM

## 2024-04-23 PROCEDURE — 95908 NRV CNDJ TST 3-4 STUDIES: CPT | Mod: 26 | Performed by: SPECIALIST

## 2024-04-23 PROCEDURE — 95886 MUSC TEST DONE W/N TEST COMP: CPT | Mod: 26 | Performed by: SPECIALIST

## 2024-04-23 PROCEDURE — 95886 MUSC TEST DONE W/N TEST COMP: CPT | Performed by: SPECIALIST

## 2024-04-23 PROCEDURE — 95908 NRV CNDJ TST 3-4 STUDIES: CPT | Performed by: SPECIALIST

## 2024-04-23 NOTE — PROCEDURES
NERVE CONDUCTION STUDIES AND ELECTROMYOGRAPHY REPORT        04/23/24      Referring provider: Yonas Chou D.O.      SUMMARY OF PATIENT'S CLINICAL HISTORY,PHYSICAL EXAM, AND RATIONALE FOR TESTING:    Mr. Brian Hogan 63 y.o. presenting with right hand numbness.  The patient has primary progressive multiple sclerosis and is in an electric wheelchair.  He states that using an upper extremity exercise machine aggravates the numbness.    Past Medical History is significant for :   Past Medical History:   Diagnosis Date    Multiple serositis (HCC)        The electrodiagnostic studies were performed to evaluate for possible peripheral neuropathy versus radiculopathy.      ELECTRODIAGNOSTIC EXAMINATION:  Nerve conduction studies (NCS) and electromyography (EMG) are utilized to evaluate direct or indirect damage to the peripheral nervous system. NCS are performed to measure the nerve(s) response(s) to electrostimulation across a given nerve segment. EMG evaluates the passive and active electrical activity of the muscle(s) in question.  Muscles are innervated by specific peripheral nerves and roots. Often times, several nerves the muscle to be examined in order to determine the presence or absence of the disease process. Furthermore, nerves and muscles may need to be tested in a [id number] comparison, as well as in additional extremities, as this may be crucial in characterizing the extent of the disease process, which may be diffuse or isolated and of varying degree of severity. The extent of the neurodiagnostic exam is justified as it may help arrive to a proper diagnosis, which ultimately may contribute to better management of the patient. Therefore, the nerves to muscles examined during the study were medically necessary.    Unless otherwise noted, temperature of the extremity(s) study was monitored before and during the examination and remained between 32 and 36 degrees C for the upper extremities, and between 30  and 36 degrees C for the lower extremities.      NERVE CONDUCTION STUDIES:  Sensory nerves:   -Right median sensory nerve was examined.The response was within normal limits.  -Right ulnar sensory nerve was examined. The response was within normal limits.    Motor nerves:   -Right median motor nerve was examined. Recording electrodes placed at the Abductor Pollicis Brevis muscles. The response was abnormal.  Onset latency was prolonged to 4.4 ms, amplitude at the elbow was decreased to 1.6 mV.  Stimulation of the median nerve at the wrist produced a 7.8 mV response and stimulation of the ulnar nerve at the wrist induced to 5.8 mV response indicating the presence of a Misha-Jagdish anastomosis.  -Right ulnar motor nerve was examined. Recording electrodes placed at the Abductor Digiti Minimi muscles. The response was within normal limits.        ELECTROMYOGRAPHY:  The study was performed the concentric needle electrode. Fibrillation and fasciculation activity is graded by convention from none (0) to continuous (4+).  Needle electrode examination was performed in the following muscles: Right deltoid, biceps, triceps, first dorsal interosseous, abductor pollicis brevis.  The muscles tested demonstrated normal insertional activity, normal motor unit morphology and recruitment. There were no elements suggestive of active denervation.      Nerve Conduction Studies     Stim Site NR Onset (ms) Norm Onset (ms) O-P Amp (µV) Norm O-P Amp Site1 Site2 Delta-P (ms) Dist (cm) Samuel (m/s) Norm Samuel (m/s)   Right Median Anti Sensory (2nd Digit)   Wrist    3.0 <3.8 13.9 >10 Wrist 2nd Digit 4.3 14.0 *33 >50   Right Ulnar Anti Sensory (5th Digit)   Wrist    2.3 <3.2 11.7 >5 Wrist 5th Digit 3.2 14.0 *44 >50        Stim Site NR Onset (ms) Norm Onset (ms) O-P Amp (mV) Norm O-P Amp Site1 Site2 Delta-0 (ms) Dist (cm) Samuel (m/s) Norm Samuel (m/s)   Right Median Motor (Abd Poll Brev)   Wrist    *4.4 <4 7.8 >5 Elbow Wrist 4.8 25.0 52 >50   Elbow    9.2   1.6  Wrist (Ulnar) Elbow 4.9 0.0     Wrist (Ulnar)    4.3  5.8          Right Ulnar Motor (Abd Dig Min)   Wrist    2.7 <3.1 8.7 >7 B Elbow Wrist 4.0 22.0 55 >50   B Elbow    6.7  8.2  A Elbow B Elbow 1.2 10.0 83    A Elbow    7.9  8.2                        Electromyography     Side Muscle Nerve Root Ins Act Fibs Psw Amp Dur Poly Recrt Int Pat Comment   Right Deltoid Axillary C5-6 Nml Nml Nml Nml Nml 0 Nml Nml    Right Biceps Musculocut C5-6 Nml Nml Nml Nml Nml 0 Nml Nml    Right Triceps Radial C6-7-8 Nml Nml Nml Nml Nml 0 Nml Nml    Right 1stDorInt Ulnar C8-T1 Nml Nml Nml Nml Nml 0 Nml Nml    Right Abd Poll Brev Median C8-T1 Nml Nml Nml Nml Nml 0 Nml Nml            DIAGNOSTIC INTERPRETATION:   Extensive electrodiagnostic studies were performed to the right upper extremity.  The results are as follows:    1.  Right carpal tunnel syndrome which is mild to moderate and electrophysiologically and not associated with motor unit changes in median nerve supplied hand muscles.    2.  Misha-Jagdish anastomosis affecting the median nerve on the right.    3.  Normal right ulnar nerve motor and sensory conduction studies.    4.  No evidence of radiculopathy in selected muscles studied right upper extremity.            INES Cline M.D.(No note.)

## 2024-05-16 ENCOUNTER — TELEPHONE (OUTPATIENT)
Dept: NEUROLOGY | Facility: MEDICAL CENTER | Age: 64
End: 2024-05-16

## 2024-05-16 ENCOUNTER — OFFICE VISIT (OUTPATIENT)
Dept: NEUROLOGY | Facility: MEDICAL CENTER | Age: 64
End: 2024-05-16
Attending: PSYCHIATRY & NEUROLOGY
Payer: COMMERCIAL

## 2024-05-16 VITALS
HEIGHT: 70 IN | TEMPERATURE: 97.8 F | WEIGHT: 200 LBS | DIASTOLIC BLOOD PRESSURE: 60 MMHG | SYSTOLIC BLOOD PRESSURE: 112 MMHG | OXYGEN SATURATION: 96 % | BODY MASS INDEX: 28.63 KG/M2 | HEART RATE: 59 BPM

## 2024-05-16 DIAGNOSIS — G35 MULTIPLE SCLEROSIS (HCC): Primary | ICD-10-CM

## 2024-05-16 DIAGNOSIS — G56.01 CARPAL TUNNEL SYNDROME OF RIGHT WRIST: ICD-10-CM

## 2024-05-16 PROCEDURE — 99215 OFFICE O/P EST HI 40 MIN: CPT | Performed by: PSYCHIATRY & NEUROLOGY

## 2024-05-16 PROCEDURE — 3078F DIAST BP <80 MM HG: CPT | Performed by: PSYCHIATRY & NEUROLOGY

## 2024-05-16 PROCEDURE — 3074F SYST BP LT 130 MM HG: CPT | Performed by: PSYCHIATRY & NEUROLOGY

## 2024-05-16 RX ORDER — DALFAMPRIDINE 10 MG/1
10 TABLET, FILM COATED, EXTENDED RELEASE ORAL 2 TIMES DAILY
Qty: 60 TABLET | Refills: 5 | Status: SHIPPED | OUTPATIENT
Start: 2024-05-16 | End: 2024-06-15

## 2024-05-16 ASSESSMENT — FIBROSIS 4 INDEX: FIB4 SCORE: 1.24

## 2024-05-16 NOTE — TELEPHONE ENCOUNTER
Prior Authorization for Dalfampridine 10 MG TABLET SR 12 HR  (Quantity: 60, Days: 30) has been submitted via Cover My Meds: Key ([id number])    Insurance: [facility]    Will follow up in 24-48 business hours.

## 2024-05-16 NOTE — PROGRESS NOTES
"[facility] NEUROLOGY  MULTIPLE SCLEROSIS & NEUROIMMUNOLOGY  FOLLOW-UP VISIT    DISEASE SUMMARY:  Principal neurologic diagnosis: MS  Diagnosis of MS: 2016  Disease History:  - 2015: episode of numbness with a sensory level at the level of the diaphragm; resolved spontaneously after ~1 week  - 2016: second episode of numbness with same sensory level as previous, resolved spontaneously  - established w/ Dr. Yoo; workup included MRIs and LP; diagnosed w/ MS  - established w/ Dr. Lancaster (MS specialist)  - 2017: started Tecfidera;   - 2017: stopped Tecfidera due to perceived ineffectiveness (progression)  - 2018: started Ocrevus, treated x1 year  - treated w/ Acthar Gel  - 2019: transitioned to Tysabri due to \"no change or improvement\" on Ocrevus  - : required a cane  - Dr. Lancaster , eventually transitioned to Dr. Mayra Mccrary ([facility])  - : started using a walker occasionally  - : transitioned to Kesimpta due to \"no change or improvement\" on Tysabri  - : got a power chair due to fatigability while walking with walker  Disease course at onset: ?relapsing or progressive  Current disease course: progressive (?primary or secondary) without activity without worsening  Previous disease therapies:  - Tecfidera:   Current disease therapies:  - Kesimpta: tolerates this well  Symptomatic therapies:  - Ampyra: tried this briefly without noticeable benefit, longer courses never approved by insurance  - Flomax: minimally helpful for nocturia  CSF ():  - reportedly consistent with MS  Other Testing:  - EMG/NCS (2024): \"right CTS which is mild to moderate...\"  MRI head:  - 2023: \"unchanged white matter lesions... no new lesion... no lesions enhance...\"  - 2022:   - 2021: \"no actively enhancing lesions... no definite interval change...\"  - 2019:   MRI cervical spine:  - 2023: \"unchanged cord lesions... no new lesions... no enhancing lesions...\"  - 2021: \"no actively " "enhancing plaques... no definite interval change...\"  - 1/16/2019: \"MS disease burden is moderate throughout the cervical cord... no actively enhancing plaques...\"  MRI thoracic spine:  - 4/11/2023: \"unchanged cord lesions... no new lesion...\"  - 7/27/2022: \"unchanged...\"  - 11/16/2021: \"no definite interval change... no actively enhancing plaques...\"  - 1/16/2019: \"chronic MS disease burden is moderate from T1 through T5... no enhancing plaques...\"  Immunizations:  - influenza?:   - Pneumonia?:   - SARS-CoV-2?: original Moderna in 2020  Cancer Screens:  - mammogram:   - PAP?:   - skin check:     CC: progressive MS without activity with worsening    INTERVAL HISTORY:  Brian Hogan is a 63 y.o. man with progressive MS without activity with worsening.  I last saw him in the MS Clinic on 1/17/2024.  At that time I recommended he continue Kesimpta, obtain pre-treatment labs, obtain Prevnar 20 vaccination, and undergo NCS of the BUEs.  Today, he was unaccompanied, and he provided the following interval history:    Brian continues on Kesimpta.  He tolerates the injections well without side effects.  He has not developed any severe infections.    His MS-related symptoms remain stable since the last visit.    He completed EMG/NCS.  He continues to experience numbness involving the medial and lateral aspects of the right hand as well as the medial right forearm.    MEDICATIONS:  Current Outpatient Medications   Medication Sig    Dalfampridine 10 MG TABLET SR 12 HR Take 10 mg by mouth 2 times a day for 30 days.    rosuvastatin (CRESTOR) 5 MG Tab TAKE 1 TABLET BY MOUTH EVERY EVENING    tamsulosin (FLOMAX) 0.4 MG capsule Take 1 Capsule by mouth 1/2 hour after breakfast.    Ofatumumab (KESIMPTA) 20 MG/0.4ML Solution Auto-injector Inject  under the skin.    vitamin D2, Ergocalciferol, (DRISDOL) 1.25 MG (54464 UT) Cap capsule Take 1 Capsule by mouth every 7 days.     MEDICAL, SOCIAL, AND FAMILY HISTORY:  There is no change in " the patient's ROS or medical, social, or family histories since the previous visit on 1/17/2024.    REVIEW OF SYSTEMS:  A ROS was completed.  Pertinent positives and negatives were included in the HPI, above.  All other systems were reviewed and are negative.    PHYSICAL EXAM:  General/Medical:  - NAD  - seated in motorized wheel chair    Neuro:  MENTAL STATUS: awake and alert; no deficits of speech or language; oriented to conversation; affect was appropriate to situation; pleasant, cooperative    CRANIAL NERVES:    II: acuity: NT, fields: NT, pupils: NT, discs: NT    III/IV/VI: versions: grossly intact    V: facial sensation: NT    VII: facial expression: symmetric    VIII: hearing: intact to voice    IX/X: palate: NT    XI: shoulder shrug: NT    XII: tongue: NT    MOTOR:  - bulk: NT  - tone: NT  Upper Extremity Strength (R/L)    NT   Elbow flexion NT   Elbow extension NT   Shoulder abduction NT     Lower Extremity Strength  (R/L)   Hip flexion NT   Knee extension NT   Knee flexion NT   Ankle dorsiflexion NT   Ankle plantarflexion NT     - pronator drift: NT  - abnormal movements: none    SENSATION:  - light touch: NT  - vibration (R/L, seconds): NT at the great toes  - pinprick: NT  - proprioception: NT  - Romberg: NT    COORDINATION:  - finger to nose: NT  - finger tapping: NT    REFLEXES:  Reflex Right Left   BR NT NT   Biceps NT NT   Triceps NT NT   Patellae NT NT   Achilles NT NT   Toes NT NT     GAIT:  - motorized wheel chair    REVIEW OF IMAGING STUDIES:  I have summarized interval data above.    REVIEW OF LABORATORY STUDIES:  1/23/2024:  - Kesimpta pre-treatment labs: within acceptable/expected limits    ASSESSMENT:  Brian Hogan is a 63 y.o. man with MS.  He remains clinically stable on Kesimpta, which he tolerates well.  Plan to continue Kesimpta, obtain drug monitoring labs, and obtain baseline imaging of the brain and cervical spine w/o contrast.  Mr. Hogan is bothered by numbness in the hand.   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  Though EMG/NCS showed only mild CTS on the right, Brian is in favor of exploring options for carpal tunnel release.    PLAN:  MS:  - continue Kesimpta  - drug monitoring labs  - MRI brain and cervical spine w/o contrast  - trial of Ampyra (dalfampridine)    CTS (Right)  - referral to [facility] to discuss release    Follow-Up:  - Return in about 6 months (around 11/16/2024).    Signed: Deandre Hull M.D.    BILLING DOCUMENTATION:   I spent 51 minutes reviewing the medical record, interviewing and examining the patient, discussing my impression (see \"assessment\" above), and coordinating care.  "

## 2024-05-17 NOTE — TELEPHONE ENCOUNTER
Prior Authorization for DALFAMPRIDINE ER TABLET EXTENDED RELEASE 12 HOUR 10 MG has been approved for a quantity of 60 , day supply 30    Prior Authorization reference number: [id number]  Insurance: [facility]  Effective dates: 05/16/204 THRU 11/16/2024  Copay: $NA     Is patient eligible to fill with [facility]? No, test claim rejected stating Plan limitations exceeded. Specialty Rx: Member Call: [phone]  The pharmacy is not in network [facility]..    Next Steps:  NA

## 2024-05-24 ENCOUNTER — HOSPITAL ENCOUNTER (OUTPATIENT)
Dept: LAB | Facility: MEDICAL CENTER | Age: 64
End: 2024-05-24
Attending: PSYCHIATRY & NEUROLOGY
Payer: COMMERCIAL

## 2024-05-24 ENCOUNTER — HOSPITAL ENCOUNTER (OUTPATIENT)
Dept: LAB | Facility: MEDICAL CENTER | Age: 64
End: 2024-05-24
Attending: STUDENT IN AN ORGANIZED HEALTH CARE EDUCATION/TRAINING PROGRAM
Payer: COMMERCIAL

## 2024-05-24 DIAGNOSIS — G35 MULTIPLE SCLEROSIS (HCC): ICD-10-CM

## 2024-05-24 DIAGNOSIS — E78.5 DYSLIPIDEMIA: ICD-10-CM

## 2024-05-24 LAB
ALBUMIN SERPL BCP-MCNC: 4.4 G/DL (ref 3.2–4.9)
ALBUMIN/GLOB SERPL: 1.5 G/DL
ALP SERPL-CCNC: 81 U/L (ref 30–99)
ALT SERPL-CCNC: 16 U/L (ref 2–50)
ANION GAP SERPL CALC-SCNC: 12 MMOL/L (ref 7–16)
AST SERPL-CCNC: 27 U/L (ref 12–45)
BILIRUB SERPL-MCNC: 0.3 MG/DL (ref 0.1–1.5)
BUN SERPL-MCNC: 15 MG/DL (ref 8–22)
CALCIUM ALBUM COR SERPL-MCNC: 8.8 MG/DL (ref 8.5–10.5)
CALCIUM SERPL-MCNC: 9.1 MG/DL (ref 8.5–10.5)
CHLORIDE SERPL-SCNC: 104 MMOL/L (ref 96–112)
CHOLEST SERPL-MCNC: 168 MG/DL (ref 100–199)
CO2 SERPL-SCNC: 25 MMOL/L (ref 20–33)
CREAT SERPL-MCNC: 0.8 MG/DL (ref 0.5–1.4)
FASTING STATUS PATIENT QL REPORTED: NORMAL
GFR SERPLBLD CREATININE-BSD FMLA CKD-EPI: 100 ML/MIN/1.73 M 2
GLOBULIN SER CALC-MCNC: 2.9 G/DL (ref 1.9–3.5)
GLUCOSE SERPL-MCNC: 105 MG/DL (ref 65–99)
HDLC SERPL-MCNC: 49 MG/DL
LDLC SERPL CALC-MCNC: 109 MG/DL
POTASSIUM SERPL-SCNC: 4.2 MMOL/L (ref 3.6–5.5)
PROT SERPL-MCNC: 7.3 G/DL (ref 6–8.2)
SODIUM SERPL-SCNC: 141 MMOL/L (ref 135–145)
TRIGL SERPL-MCNC: 52 MG/DL (ref 0–149)

## 2024-05-26 LAB
ANNOTATION COMMENT IMP: ABNORMAL
CD19 CELLS NFR SPEC: 0 % (ref 6–23)
CD3 CELLS # BLD: 1506 CELLS/UL (ref 570–2400)
CD3 CELLS NFR SPEC: 76 % (ref 62–87)
CD3+CD4+ CELLS # BLD: 1325 CELLS/UL (ref 430–1800)
CD3+CD4+ CELLS NFR BLD: 67 % (ref 32–64)
CD3+CD4+ CELLS/CD3+CD8+ CLL BLD: 7.44 RATIO (ref 0.8–3.9)
CD3+CD8+ CELLS # BLD: 173 CELLS/UL (ref 210–1200)
CD3+CD8+ CELLS NFR SPEC: 9 % (ref 15–46)
CD3-CD16+CD56+ CELLS # SPEC: 457 CELLS/UL (ref 78–470)
CD3-CD16+CD56+ CELLS NFR SPEC: 23 % (ref 4–26)
CELLS.CD3-CD19+ [#/VOLUME] IN BLOOD: 0 CELLS/UL (ref 91–610)

## 2024-05-27 LAB
IGA SERPL-MCNC: 344 MG/DL (ref 68–408)
IGG SERPL-MCNC: 1130 MG/DL (ref 768–1632)
IGM SERPL-MCNC: 46 MG/DL (ref 35–263)

## 2024-08-01 ENCOUNTER — APPOINTMENT (OUTPATIENT)
Dept: MEDICAL GROUP | Facility: LAB | Age: 64
End: 2024-08-01
Payer: COMMERCIAL

## 2024-08-01 DIAGNOSIS — R20.2 PARESTHESIAS: ICD-10-CM

## 2024-08-01 DIAGNOSIS — Z12.5 PROSTATE CANCER SCREENING: ICD-10-CM

## 2024-08-01 DIAGNOSIS — Z00.00 ANNUAL PHYSICAL EXAM: ICD-10-CM

## 2024-08-01 DIAGNOSIS — E78.5 DYSLIPIDEMIA: ICD-10-CM

## 2024-08-01 RX ORDER — ROSUVASTATIN CALCIUM 5 MG/1
TABLET, COATED ORAL
Qty: 135 TABLET | Refills: 3 | Status: SHIPPED | OUTPATIENT
Start: 2024-08-01

## 2024-08-01 ASSESSMENT — ENCOUNTER SYMPTOMS
SHORTNESS OF BREATH: 0
FEVER: 0
CHILLS: 0

## 2024-08-01 NOTE — PROGRESS NOTES
Subjective:   This evaluation was conducted via teams using secure and encrypted videoconferencing technology. The patient was in a private location in the state of [place].    The patient's identity was confirmed and verbal consent was obtained for this virtual visit.    CC:   Chief Complaint   Patient presents with    Results        HPI:   History of Present Illness         Problem   Paresthesias    In the upper extremities.  Patient has been following with neurology and had an EMG and there is concern for carpal tunnel and ulnar pathology.  Patient will be making appointment with an hand surgeon to discuss next options     Dyslipidemia      Lab Results   Component Value Date/Time    CHOLSTRLTOT 304 (H) 01/23/2024 09:33 AM     (H) 01/23/2024 09:33 AM    HDL 52 01/23/2024 09:33 AM    TRIGLYCERIDE 69 01/23/2024 09:33 AM     8/1/2024  - Previously patient was started on rosuvastatin 5 mg and is tolerating well  Lab Results   Component Value Date/Time    CHOLSTRLTOT 168 05/24/2024 09:12 AM     (H) 05/24/2024 09:12 AM    HDL 49 05/24/2024 09:12 AM    TRIGLYCERIDE 52 05/24/2024 09:12 AM                    Current Outpatient Medications Ordered in Epic   Medication Sig Dispense Refill    rosuvastatin (CRESTOR) 5 MG Tab Take 1 pill every evening on odd days and every even day take 2 pills by mouth. 135 Tablet 3    tamsulosin (FLOMAX) 0.4 MG capsule Take 1 Capsule by mouth 1/2 hour after breakfast. 90 Capsule 1    Ofatumumab (KESIMPTA) 20 MG/0.4ML Solution Auto-injector Inject  under the skin.      vitamin D2, Ergocalciferol, (DRISDOL) 1.25 MG (30141 UT) Cap capsule Take 1 Capsule by mouth every 7 days. 12 Capsule 2     No current Epic-ordered facility-administered medications on file.           ROS:  Review of Systems   Constitutional:  Negative for chills and fever.   Respiratory:  Negative for shortness of breath.    Cardiovascular:  Negative for chest pain.       Objective:     Exam:  There were no vitals  taken for this visit. There is no height or weight on file to calculate BMI.    Physical Exam  Constitutional:       General: He is not in acute distress.     Appearance: He is not ill-appearing.   Pulmonary:      Effort: Pulmonary effort is normal.   Neurological:      Mental Status: He is alert.   Psychiatric:         Mood and Affect: Mood normal.         Behavior: Behavior normal.         Thought Content: Thought content normal.         Judgment: Judgment normal.                   Assessment & Plan:     Problem List Items Addressed This Visit       Dyslipidemia     Chronic-not completely controlled  -increase rosuvastatin, patient was presented with 2 options of either doubling up every other day or taking 10 mg once a day.  Patient would like to double up every other day         Relevant Medications    rosuvastatin (CRESTOR) 5 MG Tab    Paresthesias     Other Visit Diagnoses       Prostate cancer screening        Relevant Orders    PROSTATE SPECIFIC AG SCREENING    Annual physical exam        Relevant Orders    HEMOGLOBIN A1C    CBC WITH DIFFERENTIAL    Comp Metabolic Panel    TSH WITH REFLEX TO FT4    Lipid Profile    PROSTATE SPECIFIC AG SCREENING          6 month annual     Please note that this dictation was created using voice recognition software. I have made every reasonable attempt to correct obvious errors, but I expect that there are errors of grammar and possibly content that I did not discover before finalizing the note.

## 2024-08-01 NOTE — ASSESSMENT & PLAN NOTE
Chronic-not completely controlled  -increase rosuvastatin, patient was presented with 2 options of either doubling up every other day or taking 10 mg once a day.  Patient would like to double up every other day

## 2024-09-03 DIAGNOSIS — E55.9 HYPOVITAMINOSIS D: ICD-10-CM

## 2024-09-03 NOTE — TELEPHONE ENCOUNTER
Received request via: Pharmacy    Was the patient seen in the last year in this department? Yes    Does the patient have an active prescription (recently filled or refills available) for medication(s) requested? No    [facility]     Does the patient have long term Plus and need 100-day supply? (This applies to ALL medications) Patient does not have SCP

## 2024-09-04 RX ORDER — ERGOCALCIFEROL 1.25 MG/1
50000 CAPSULE, LIQUID FILLED ORAL
Qty: 12 CAPSULE | Refills: 2 | Status: SHIPPED | OUTPATIENT
Start: 2024-09-04

## 2024-11-07 ENCOUNTER — APPOINTMENT (OUTPATIENT)
Dept: NEUROLOGY | Facility: MEDICAL CENTER | Age: 64
End: 2024-11-07
Attending: PSYCHIATRY & NEUROLOGY
Payer: COMMERCIAL

## 2024-11-15 ENCOUNTER — TELEPHONE (OUTPATIENT)
Dept: NEUROLOGY | Facility: MEDICAL CENTER | Age: 64
End: 2024-11-15
Payer: COMMERCIAL

## 2024-11-15 DIAGNOSIS — G35 MULTIPLE SCLEROSIS (HCC): Primary | ICD-10-CM

## 2024-11-15 NOTE — TELEPHONE ENCOUNTER
Received request via: Patient    Medication Name/Dosage Ofatumumab (Kesimpta) 20mg/0.4ml     When was medication last prescribed 11/1/23    How many refills were previously provided NA    How many Refills does he patient have left from last prescription 0    Was the patient seen in the last year in this department? Yes   Date of last office visit 5/16/24     Per last Neurology Office Visit, when was the date of next follow up visit set for?  6 months                          Date of office visit follow up request 11/16/24     Does the patient have an upcoming appointment? Yes   If yes, when 12/12/24             If no, schedule appointment Scheduled    Does the patient have [facility] Plus and need 100 day supply (blood pressure, diabetes and cholesterol meds only)? Patient does not have SCP

## 2024-11-15 NOTE — TELEPHONE ENCOUNTER
Patient said he needs a refill on his Kresimpta he said he's tried to reach out multiple times he said he's also been out of it for some time and that his pharmacy has tried calling as well he would like a call back about his medication.

## 2024-11-18 ENCOUNTER — TELEPHONE (OUTPATIENT)
Dept: NEUROLOGY | Facility: MEDICAL CENTER | Age: 64
End: 2024-11-18
Payer: COMMERCIAL

## 2024-11-18 RX ORDER — OFATUMUMAB 20 MG/.4ML
0.4 INJECTION, SOLUTION SUBCUTANEOUS
Qty: 0.56 ML | Refills: 11 | Status: SHIPPED | OUTPATIENT
Start: 2024-11-18 | End: 2024-11-25 | Stop reason: SDUPTHER

## 2024-11-18 NOTE — TELEPHONE ENCOUNTER
Prior Authorization for Keimpta has been approved for a quantity of 0.4ml , day supply 30    Prior Authorization reference number: [id number]  Insurance: [employer]  Effective dates: 11/18/24-11/18/25  Copay: $N/A     Is patient eligible to fill with [facility]? The pharmacy is not in network [facility].    Next Steps:  Will release to pts pharmacy on file

## 2024-11-18 NOTE — TELEPHONE ENCOUNTER
Prior Authorization for Kesimpta (Quantity: 0.4ml, Days: 30) has been submitted via Cover My Meds: Key ([id number])    Insurance: [employer]    Will follow up in 24-48 business hours.

## 2024-11-18 NOTE — TELEPHONE ENCOUNTER
Received [employer] [facility] request via [employer]  for Kesimpta. (Quantity:0.4ml, Day Supply:30)     Insurance: [employer]  Member ID:  [id number]  BIN: [id number]  PCN: [id number]  Group: [facility]     Ran Test claim via [place] & medication Rejects stating prior authorization is required.

## 2024-11-22 DIAGNOSIS — G35 MULTIPLE SCLEROSIS (HCC): Primary | ICD-10-CM

## 2024-11-25 DIAGNOSIS — G35 MULTIPLE SCLEROSIS (HCC): ICD-10-CM

## 2024-11-25 RX ORDER — OFATUMUMAB 20 MG/.4ML
20 INJECTION, SOLUTION SUBCUTANEOUS
Qty: 0.56 ML | Refills: 11 | Status: SHIPPED | OUTPATIENT
Start: 2024-11-25 | End: 2025-11-25

## 2024-11-25 RX ORDER — DALFAMPRIDINE 10 MG/1
1 TABLET, FILM COATED, EXTENDED RELEASE ORAL 2 TIMES DAILY
Qty: 180 TABLET | Refills: 3 | Status: SHIPPED | OUTPATIENT
Start: 2024-11-25 | End: 2025-11-25

## 2024-11-26 NOTE — TELEPHONE ENCOUNTER
Prior Authorization for Dalfampridine (Quantity: 180, Days: 90) has been submitted via Cover My Meds: Key ([id number])    Insurance: [employer]    Will follow up in 24-48 business hours.

## 2024-12-12 ENCOUNTER — PHARMACY VISIT (OUTPATIENT)
Dept: PHARMACY | Facility: MEDICAL CENTER | Age: 64
End: 2024-12-12
Payer: COMMERCIAL

## 2024-12-12 ENCOUNTER — APPOINTMENT (OUTPATIENT)
Dept: NEUROLOGY | Facility: MEDICAL CENTER | Age: 64
End: 2024-12-12
Attending: PSYCHIATRY & NEUROLOGY
Payer: COMMERCIAL

## 2024-12-12 VITALS
WEIGHT: 220 LBS | HEIGHT: 70 IN | OXYGEN SATURATION: 95 % | HEART RATE: 64 BPM | DIASTOLIC BLOOD PRESSURE: 82 MMHG | RESPIRATION RATE: 16 BRPM | TEMPERATURE: 97.4 F | SYSTOLIC BLOOD PRESSURE: 130 MMHG | BODY MASS INDEX: 31.5 KG/M2

## 2024-12-12 DIAGNOSIS — G35 MULTIPLE SCLEROSIS (HCC): Primary | ICD-10-CM

## 2024-12-12 PROCEDURE — 99215 OFFICE O/P EST HI 40 MIN: CPT | Performed by: PSYCHIATRY & NEUROLOGY

## 2024-12-12 PROCEDURE — RXMED WILLOW AMBULATORY MEDICATION CHARGE: Performed by: INTERNAL MEDICINE

## 2024-12-12 PROCEDURE — 3079F DIAST BP 80-89 MM HG: CPT | Performed by: PSYCHIATRY & NEUROLOGY

## 2024-12-12 PROCEDURE — 3075F SYST BP GE 130 - 139MM HG: CPT | Performed by: PSYCHIATRY & NEUROLOGY

## 2024-12-12 PROCEDURE — 99212 OFFICE O/P EST SF 10 MIN: CPT | Performed by: PSYCHIATRY & NEUROLOGY

## 2024-12-12 RX ORDER — OFATUMUMAB 20 MG/.4ML
20 INJECTION, SOLUTION SUBCUTANEOUS
Qty: 0.56 ML | Refills: 11 | Status: SHIPPED | OUTPATIENT
Start: 2024-12-12 | End: 2025-12-12

## 2024-12-12 RX ORDER — PNEUMOCOCCAL 20-VALENT CONJUGATE VACCINE 2.2; 2.2; 2.2; 2.2; 2.2; 2.2; 2.2; 2.2; 2.2; 2.2; 2.2; 2.2; 2.2; 2.2; 2.2; 2.2; 4.4; 2.2; 2.2; 2.2 UG/.5ML; UG/.5ML; UG/.5ML; UG/.5ML; UG/.5ML; UG/.5ML; UG/.5ML; UG/.5ML; UG/.5ML; UG/.5ML; UG/.5ML; UG/.5ML; UG/.5ML; UG/.5ML; UG/.5ML; UG/.5ML; UG/.5ML; UG/.5ML; UG/.5ML; UG/.5ML
INJECTION, SUSPENSION INTRAMUSCULAR
Qty: 0.5 ML | Refills: 0 | OUTPATIENT
Start: 2024-12-12

## 2024-12-12 RX ORDER — GABAPENTIN 300 MG/1
300 CAPSULE ORAL 3 TIMES DAILY
Qty: 90 CAPSULE | Refills: 11 | Status: SHIPPED | OUTPATIENT
Start: 2024-12-12 | End: 2025-12-07

## 2024-12-12 ASSESSMENT — PATIENT HEALTH QUESTIONNAIRE - PHQ9
5. POOR APPETITE OR OVEREATING: 0 - NOT AT ALL
CLINICAL INTERPRETATION OF PHQ2 SCORE: 4
SUM OF ALL RESPONSES TO PHQ QUESTIONS 1-9: 9

## 2024-12-12 ASSESSMENT — FIBROSIS 4 INDEX: FIB4 SCORE: 1.74

## 2024-12-12 NOTE — PROGRESS NOTES
"[facility] NEUROLOGY  MULTIPLE SCLEROSIS & NEUROIMMUNOLOGY  FOLLOW-UP VISIT    DISEASE SUMMARY:  Principal neurologic diagnosis: MS  Diagnosis of MS: 2016  Disease History:  - : episode of ?left optic neuritis  - 2015: episode of numbness with a sensory level at the level of the diaphragm; resolved spontaneously after ~1 week  - 2016: second episode of numbness with same sensory level as previous, resolved spontaneously  - established w/ Dr. Yoo; workup included MRIs and LP; diagnosed w/ MS  - established w/ Dr. Lancaster (MS specialist)  - 2017: started Tecfidera;   - 2017: stopped Tecfidera due to perceived ineffectiveness (progression)  - 2018: started Ocrevus, treated x1 year  - treated w/ Acthar Gel  - 2019: transitioned to Tysabri due to \"no change or improvement\" on Ocrevus  - : required a cane  - Dr. Lancaster , eventually transitioned to Dr. Mayra Mccrary ([facility])  - : started using a walker occasionally  - : transitioned to Kesimpta due to \"no change or improvement\" on Tysabri  - : got a power chair due to fatigability while walking with walker  Disease course at onset: ?relapsing or progressive  Current disease course: progressive (?primary or secondary) without activity without worsening  Previous disease therapies:  - Tecfidera:   Current disease therapies:  - Kesimpta: tolerates this well  Symptomatic therapies:  - Ampyra: most likely ineffective, didn't notice he was worse after stopping the drug  - Flomax: minimally helpful for nocturia  - gabapentin:   CSF ():  - reportedly consistent with MS  Other Testing:  - EMG/NCS (2024): \"right CTS which is mild to moderate...\"  MRI head:  - 2023: \"unchanged white matter lesions... no new lesion... no lesions enhance...\"  - 2022:   - 2021: \"no actively enhancing lesions... no definite interval change...\"  - 2019:   MRI cervical spine:  - 2023: \"unchanged cord lesions... no new lesions... no enhancing " "lesions...\"  - 11/16/2021: \"no actively enhancing plaques... no definite interval change...\"  - 1/16/2019: \"MS disease burden is moderate throughout the cervical cord... no actively enhancing plaques...\"  MRI thoracic spine:  - 4/11/2023: \"unchanged cord lesions... no new lesion...\"  - 7/27/2022: \"unchanged...\"  - 11/16/2021: \"no definite interval change... no actively enhancing plaques...\"  - 1/16/2019: \"chronic MS disease burden is moderate from T1 through T5... no enhancing plaques...\"  Immunizations:  - influenza?:   - Pneumonia?:   - SARS-CoV-2?: original Moderna in 2020  Cancer Screens:  - mammogram:   - PAP?:   - skin check:     CC: progressive MS without activity with worsening    INTERVAL HISTORY:  Brian Hogan is a 64 y.o. man with progressive MS without activity with worsening.  I last saw him in the MS Clinic on 5/16/2024.  At that time I recommended he continue Kesimpta, obtain drug monitoring labs, repeat imaging, and try Ampyra.  Today, he was accompanied by his wife, and he provided the following interval history:    Mr. Hogan's walking is worse since the last visit.  He requests an updated handicapped placard.    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  He comes to [employer] once weekly and works from home the rest of the week.    A review of MS-related symptoms was notable for the following:    Fatigue:   Weakness:   Numbness:   Incoordination:   Spasms/Spasticity:   Vision Impairment:   Walking/Balance Problems:   Neuralgia: yes, present in the lower extremities, not \"painful,\" but irritating  Bowel Symptoms:   Bladder Symptoms:   Heat Sensitivity:   Depression:   Cognitive/Memory Problems:   Sexual Dysfunction:   Anxiety:     He continues on Kesimpta.  He tolerates the injections well without side effects.  He has not developed any severe infections.    MEDICATIONS:  Current Outpatient Medications   Medication Sig    Ofatumumab (KESIMPTA) 20 MG/0.4ML Solution " Auto-injector Inject 20 mg under the skin every 30 (thirty) days.    gabapentin (NEURONTIN) 300 MG Cap Take 1 Capsule by mouth 3 times a day for 360 days.    vitamin D2, Ergocalciferol, (DRISDOL) 1.25 MG (49503 UT) Cap capsule TAKE 1 CAPSULE BY MOUTH EVERY 7 DAYS    rosuvastatin (CRESTOR) 5 MG Tab Take 1 pill every evening on odd days and every even day take 2 pills by mouth.    tamsulosin (FLOMAX) 0.4 MG capsule Take 1 Capsule by mouth 1/2 hour after breakfast.     MEDICAL, SOCIAL, AND FAMILY HISTORY:  There is no change in the patient's ROS or medical, social, or family histories since the previous visit on 5/16/2024.    REVIEW OF SYSTEMS:  A ROS was completed.  Pertinent positives and negatives were included in the HPI, above.  All other systems were reviewed and are negative.    PHYSICAL EXAM:  General/Medical:  - NAD  - seated in motorized wheel chair    Neuro:  MENTAL STATUS: awake and alert; no deficits of speech or language; oriented to conversation; affect was appropriate to situation; pleasant, cooperative    CRANIAL NERVES:    II: acuity: NT, fields: NT, pupils: NT, discs: NT    III/IV/VI: versions: grossly intact    V: facial sensation: NT    VII: facial expression: symmetric    VIII: hearing: intact to voice    IX/X: palate: NT    XI: shoulder shrug: NT    XII: tongue: NT    MOTOR:  - bulk: NT  - tone: NT  Upper Extremity Strength (R/L)    NT   Elbow flexion NT   Elbow extension NT   Shoulder abduction NT     Lower Extremity Strength  (R/L)   Hip flexion NT   Knee extension NT   Knee flexion NT   Ankle dorsiflexion NT   Ankle plantarflexion NT     - pronator drift: NT  - abnormal movements: none    SENSATION:  - light touch: NT  - vibration (R/L, seconds): NT at the great toes  - pinprick: NT  - proprioception: NT  - Romberg: NT    COORDINATION:  - finger to nose: NT  - finger tapping: NT    REFLEXES:  Reflex Right Left   BR NT NT   Biceps NT NT   Triceps NT NT   Patellae NT NT   Achilles NT NT  "  Toes NT NT     GAIT:  - motorized wheel chair  - walker    QUANTITATIVE SCORES:  Timed 25-foot walk (sec): 21.0 on 12/12/2024 (17.0 on 1/17/2024).  Assistive device: walker    REVIEW OF IMAGING STUDIES:  I have summarized interval data above.    REVIEW OF LABORATORY STUDIES:  5/24/2024:  - lymphocyte subsets: CD19 %/Ct: 0/0  - immunoglobulins: WNL    ASSESSMENT:  Brian Hogan is a 64 y.o. man with MS.  His neurologic functional status has worsened slightly despite treatment with Kesimpta.  Ampyra probably didn't offer much benefit.  We will try a bit of gabapentin for the neuropathic discomfort in the lower extremities.  I recommended the Prevnar 20 and influenza vaccines.  Plan for drug monitoring labs.    PLAN:  MS:  - continue Kesimpta  - drug monitoring labs  - MRI brain and cervical spine w/o contrast  - remain off Ampyra  - trial of gabapentin  - referral to PT    Follow-Up:  - Return in about 6 months (around 6/12/2025).    Signed: Deandre Hull M.D.    BILLING DOCUMENTATION:   I spent 49 minutes reviewing the medical record, interviewing and examining the patient, discussing my impression (see \"assessment\" above), and coordinating care.  "

## 2024-12-16 ENCOUNTER — TELEPHONE (OUTPATIENT)
Dept: NEUROLOGY | Facility: MEDICAL CENTER | Age: 64
End: 2024-12-16
Payer: COMMERCIAL

## 2024-12-16 NOTE — TELEPHONE ENCOUNTER
Prior Authorization for Kesimpta (Quantity: 0.4ml, Days: 30) has been submitted via Cover My Meds: Key ([id number])    Insurance: [employer]    Will follow up in 24-48 business hours.

## 2024-12-18 ENCOUNTER — TELEPHONE (OUTPATIENT)
Dept: NEUROLOGY | Facility: MEDICAL CENTER | Age: 64
End: 2024-12-18
Payer: COMMERCIAL

## 2024-12-18 NOTE — TELEPHONE ENCOUNTER
This RX was cancelled at last visit. I called pt to verify that he was no longer taking RX. I called [facility] and spoke with Lluvia. She has discontinued the RX on their end.

## 2024-12-18 NOTE — TELEPHONE ENCOUNTER
[facility] called to get the medication order put in for (dalfampridine-er) if you have any other questions call [facility] at [phone]

## 2025-02-04 ENCOUNTER — APPOINTMENT (OUTPATIENT)
Dept: MEDICAL GROUP | Facility: LAB | Age: 65
End: 2025-02-04
Payer: COMMERCIAL

## 2025-02-22 DIAGNOSIS — R39.12 BENIGN PROSTATIC HYPERPLASIA WITH WEAK URINARY STREAM: ICD-10-CM

## 2025-02-22 DIAGNOSIS — N40.1 BENIGN PROSTATIC HYPERPLASIA WITH WEAK URINARY STREAM: ICD-10-CM

## 2025-02-24 RX ORDER — TAMSULOSIN HYDROCHLORIDE 0.4 MG/1
0.4 CAPSULE ORAL
Qty: 90 CAPSULE | Refills: 1 | Status: SHIPPED | OUTPATIENT
Start: 2025-02-24

## 2025-03-18 DIAGNOSIS — E78.5 DYSLIPIDEMIA: ICD-10-CM

## 2025-03-18 RX ORDER — ATORVASTATIN CALCIUM 10 MG/1
TABLET, FILM COATED ORAL
Qty: 135 TABLET | Refills: 3 | Status: SHIPPED | OUTPATIENT
Start: 2025-03-18

## 2025-06-07 SDOH — ECONOMIC STABILITY: INCOME INSECURITY: IN THE LAST 12 MONTHS, WAS THERE A TIME WHEN YOU WERE NOT ABLE TO PAY THE MORTGAGE OR RENT ON TIME?: NO

## 2025-06-07 SDOH — HEALTH STABILITY: PHYSICAL HEALTH: ON AVERAGE, HOW MANY DAYS PER WEEK DO YOU ENGAGE IN MODERATE TO STRENUOUS EXERCISE (LIKE A BRISK WALK)?: 0 DAYS

## 2025-06-07 SDOH — ECONOMIC STABILITY: FOOD INSECURITY: WITHIN THE PAST 12 MONTHS, YOU WORRIED THAT YOUR FOOD WOULD RUN OUT BEFORE YOU GOT MONEY TO BUY MORE.: NEVER TRUE

## 2025-06-07 SDOH — ECONOMIC STABILITY: INCOME INSECURITY: HOW HARD IS IT FOR YOU TO PAY FOR THE VERY BASICS LIKE FOOD, HOUSING, MEDICAL CARE, AND HEATING?: NOT HARD AT ALL

## 2025-06-07 SDOH — ECONOMIC STABILITY: FOOD INSECURITY: WITHIN THE PAST 12 MONTHS, THE FOOD YOU BOUGHT JUST DIDN'T LAST AND YOU DIDN'T HAVE MONEY TO GET MORE.: NEVER TRUE

## 2025-06-07 SDOH — HEALTH STABILITY: PHYSICAL HEALTH: ON AVERAGE, HOW MANY MINUTES DO YOU ENGAGE IN EXERCISE AT THIS LEVEL?: 0 MIN

## 2025-06-07 SDOH — HEALTH STABILITY: MENTAL HEALTH
STRESS IS WHEN SOMEONE FEELS TENSE, NERVOUS, ANXIOUS, OR CAN'T SLEEP AT NIGHT BECAUSE THEIR MIND IS TROUBLED. HOW STRESSED ARE YOU?: NOT AT ALL

## 2025-06-07 ASSESSMENT — SOCIAL DETERMINANTS OF HEALTH (SDOH)
IN THE PAST 12 MONTHS, HAS THE ELECTRIC, GAS, OIL, OR WATER COMPANY THREATENED TO SHUT OFF SERVICE IN YOUR HOME?: NO
IN A TYPICAL WEEK, HOW MANY TIMES DO YOU TALK ON THE PHONE WITH FAMILY, FRIENDS, OR NEIGHBORS?: MORE THAN THREE TIMES A WEEK
HOW MANY DRINKS CONTAINING ALCOHOL DO YOU HAVE ON A TYPICAL DAY WHEN YOU ARE DRINKING: PATIENT DOES NOT DRINK
HOW OFTEN DO YOU GET TOGETHER WITH FRIENDS OR RELATIVES?: ONCE A WEEK
HOW OFTEN DO YOU GET TOGETHER WITH FRIENDS OR RELATIVES?: ONCE A WEEK
HOW HARD IS IT FOR YOU TO PAY FOR THE VERY BASICS LIKE FOOD, HOUSING, MEDICAL CARE, AND HEATING?: NOT HARD AT ALL
HOW OFTEN DO YOU ATTENT MEETINGS OF THE CLUB OR ORGANIZATION YOU BELONG TO?: NEVER
DO YOU BELONG TO ANY CLUBS OR ORGANIZATIONS SUCH AS CHURCH GROUPS UNIONS, FRATERNAL OR ATHLETIC GROUPS, OR SCHOOL GROUPS?: NO
WITHIN THE PAST 12 MONTHS, YOU WORRIED THAT YOUR FOOD WOULD RUN OUT BEFORE YOU GOT THE MONEY TO BUY MORE: NEVER TRUE
HOW OFTEN DO YOU ATTEND CHURCH OR RELIGIOUS SERVICES?: NEVER
HOW OFTEN DO YOU ATTENT MEETINGS OF THE CLUB OR ORGANIZATION YOU BELONG TO?: NEVER
HOW OFTEN DO YOU HAVE A DRINK CONTAINING ALCOHOL: NEVER
DO YOU BELONG TO ANY CLUBS OR ORGANIZATIONS SUCH AS CHURCH GROUPS UNIONS, FRATERNAL OR ATHLETIC GROUPS, OR SCHOOL GROUPS?: NO
HOW OFTEN DO YOU HAVE SIX OR MORE DRINKS ON ONE OCCASION: NEVER
HOW OFTEN DO YOU ATTEND CHURCH OR RELIGIOUS SERVICES?: NEVER
IN A TYPICAL WEEK, HOW MANY TIMES DO YOU TALK ON THE PHONE WITH FAMILY, FRIENDS, OR NEIGHBORS?: MORE THAN THREE TIMES A WEEK

## 2025-06-07 ASSESSMENT — LIFESTYLE VARIABLES
SKIP TO QUESTIONS 9-10: 1
HOW OFTEN DO YOU HAVE A DRINK CONTAINING ALCOHOL: NEVER
HOW OFTEN DO YOU HAVE SIX OR MORE DRINKS ON ONE OCCASION: NEVER
HOW MANY STANDARD DRINKS CONTAINING ALCOHOL DO YOU HAVE ON A TYPICAL DAY: PATIENT DOES NOT DRINK
AUDIT-C TOTAL SCORE: 0

## 2025-06-10 ENCOUNTER — APPOINTMENT (OUTPATIENT)
Dept: MEDICAL GROUP | Facility: LAB | Age: 65
End: 2025-06-10
Payer: COMMERCIAL

## 2025-06-10 VITALS
OXYGEN SATURATION: 94 % | SYSTOLIC BLOOD PRESSURE: 116 MMHG | BODY MASS INDEX: 31.5 KG/M2 | HEIGHT: 70 IN | RESPIRATION RATE: 16 BRPM | TEMPERATURE: 97.2 F | HEART RATE: 66 BPM | WEIGHT: 220 LBS | DIASTOLIC BLOOD PRESSURE: 68 MMHG

## 2025-06-10 DIAGNOSIS — Z12.11 COLON CANCER SCREENING: ICD-10-CM

## 2025-06-10 DIAGNOSIS — R06.83 SNORING: ICD-10-CM

## 2025-06-10 DIAGNOSIS — R39.12 BENIGN PROSTATIC HYPERPLASIA WITH WEAK URINARY STREAM: ICD-10-CM

## 2025-06-10 DIAGNOSIS — N40.1 BENIGN PROSTATIC HYPERPLASIA WITH WEAK URINARY STREAM: ICD-10-CM

## 2025-06-10 DIAGNOSIS — E78.5 DYSLIPIDEMIA: Primary | ICD-10-CM

## 2025-06-10 DIAGNOSIS — R20.2 PARESTHESIAS: ICD-10-CM

## 2025-06-10 DIAGNOSIS — E55.9 HYPOVITAMINOSIS D: ICD-10-CM

## 2025-06-10 PROCEDURE — 3074F SYST BP LT 130 MM HG: CPT | Performed by: STUDENT IN AN ORGANIZED HEALTH CARE EDUCATION/TRAINING PROGRAM

## 2025-06-10 PROCEDURE — 3078F DIAST BP <80 MM HG: CPT | Performed by: STUDENT IN AN ORGANIZED HEALTH CARE EDUCATION/TRAINING PROGRAM

## 2025-06-10 PROCEDURE — 99212 OFFICE O/P EST SF 10 MIN: CPT | Mod: 25 | Performed by: STUDENT IN AN ORGANIZED HEALTH CARE EDUCATION/TRAINING PROGRAM

## 2025-06-10 PROCEDURE — 99396 PREV VISIT EST AGE 40-64: CPT | Performed by: STUDENT IN AN ORGANIZED HEALTH CARE EDUCATION/TRAINING PROGRAM

## 2025-06-10 RX ORDER — ROSUVASTATIN CALCIUM 5 MG/1
5 TABLET, COATED ORAL EVERY EVENING
Qty: 90 TABLET | Refills: 3 | Status: SHIPPED | OUTPATIENT
Start: 2025-06-10

## 2025-06-10 RX ORDER — ERGOCALCIFEROL 1.25 MG/1
50000 CAPSULE, LIQUID FILLED ORAL
Qty: 12 CAPSULE | Refills: 2 | Status: SHIPPED | OUTPATIENT
Start: 2025-06-10

## 2025-06-10 RX ORDER — TAMSULOSIN HYDROCHLORIDE 0.4 MG/1
0.4 CAPSULE ORAL
Qty: 90 CAPSULE | Refills: 1 | Status: SHIPPED | OUTPATIENT
Start: 2025-06-10

## 2025-06-10 ASSESSMENT — ENCOUNTER SYMPTOMS
DIZZINESS: 0
SORE THROAT: 0
BLURRED VISION: 0
FEVER: 0
MYALGIAS: 0
SHORTNESS OF BREATH: 0
VOMITING: 0
WEAKNESS: 1
CHILLS: 0
TINGLING: 1
NAUSEA: 0

## 2025-06-10 ASSESSMENT — FIBROSIS 4 INDEX: FIB4 SCORE: 1.74

## 2025-06-10 ASSESSMENT — PATIENT HEALTH QUESTIONNAIRE - PHQ9: CLINICAL INTERPRETATION OF PHQ2 SCORE: 0

## 2025-06-10 NOTE — PROGRESS NOTES
Subjective:     Subjective:     CC:   Chief Complaint   Patient presents with    Annual Wellness Visit       HPI:   Brian Hogan is a 64 y.o. male who presents for annual exam  Problem   Snoring   Paresthesias    In the upper extremities.  Patient has been following with neurology and had an EMG and there is concern for carpal tunnel and ulnar pathology.  Patient will be making appointment with an hand surgeon to discuss next options    6/10/2025  - Patient had carpal tunnel surgery and reports no improvement in his neuropathy in his hands     Dyslipidemia      Lab Results   Component Value Date/Time    CHOLSTRLTOT 304 (H) 01/23/2024 09:33 AM     (H) 01/23/2024 09:33 AM    HDL 52 01/23/2024 09:33 AM    TRIGLYCERIDE 69 01/23/2024 09:33 AM     8/1/2024  - Previously patient was started on rosuvastatin 5 mg and is tolerating well  Lab Results   Component Value Date/Time    CHOLSTRLTOT 168 05/24/2024 09:12 AM     (H) 05/24/2024 09:12 AM    HDL 49 05/24/2024 09:12 AM    TRIGLYCERIDE 52 05/24/2024 09:12 AM       6/10/2025  - Patient has had issues with picking up his cholesterol medication due to insurance confusion            Last Colorectal Cancer Screening: ordered previously   Last Tdap: unable to recall  Received HPV series: No  Hx STDs: No     Exercise: 30 minutes of exercise daily 4x/week  Diet: home cooked meals, with fruits and vegetables     He  has a past medical history of Multiple serositis (HCC).  He  has no past surgical history on file.    Family History   Problem Relation Age of Onset    No Known Problems Mother     Heart Disease Father     Leukemia Father     Leukemia Brother      Social History[1]  He  has no history on file for sexual activity.    Patient Active Problem List    Diagnosis Date Noted    Snoring 06/10/2025    Paresthesias 08/01/2024    Dyslipidemia 02/01/2024    Multiple sclerosis (HCC) 11/01/2023    Hypovitaminosis D 11/01/2023    Insomnia due to medical condition  "11/01/2023     Current Medications[2]  Allergies[3]    Review of Systems   Review of Systems   Constitutional:  Negative for chills and fever.   HENT:  Negative for sore throat.    Eyes:  Negative for blurred vision.   Respiratory:  Negative for shortness of breath.    Cardiovascular:  Negative for chest pain.   Gastrointestinal:  Negative for nausea and vomiting.   Genitourinary:  Negative for dysuria.   Musculoskeletal:  Negative for myalgias.   Skin:  Negative for rash.   Neurological:  Positive for tingling and weakness. Negative for dizziness.        Objective:   /68 (BP Location: Right arm, Patient Position: Sitting, BP Cuff Size: Adult long)   Pulse 66   Temp 36.2 °C (97.2 °F) (Temporal)   Resp 16   Ht 1.778 m (5' 10\")   Wt 99.8 kg (220 lb)   SpO2 94%   BMI 31.57 kg/m²      Wt Readings from Last 4 Encounters:   06/10/25 99.8 kg (220 lb)   12/12/24 99.8 kg (220 lb)   05/16/24 90.7 kg (200 lb)   02/01/24 95.3 kg (210 lb)           Physical Exam:  Physical Exam  Constitutional:       Appearance: Normal appearance.   HENT:      Head: Normocephalic and atraumatic.      Right Ear: Tympanic membrane and ear canal normal.      Left Ear: Tympanic membrane and ear canal normal.      Mouth/Throat:      Mouth: Mucous membranes are moist.      Pharynx: Oropharynx is clear.   Eyes:      Extraocular Movements: Extraocular movements intact.      Pupils: Pupils are equal, round, and reactive to light.   Neck:      Thyroid: No thyromegaly.   Cardiovascular:      Rate and Rhythm: Normal rate and regular rhythm.      Heart sounds: Normal heart sounds.   Pulmonary:      Effort: Pulmonary effort is normal.      Breath sounds: Normal breath sounds.   Abdominal:      General: Abdomen is flat. Bowel sounds are normal.      Palpations: Abdomen is soft.   Musculoskeletal:      Cervical back: Normal range of motion and neck supple.      Right lower leg: Edema present.      Left lower leg: Edema present.   Lymphadenopathy: "      Cervical: No cervical adenopathy.   Skin:     General: Skin is warm and dry.   Neurological:      Mental Status: He is alert.             Assessment and Plan:     Problem List Items Addressed This Visit       Dyslipidemia - Primary    Chronic  - Will send rosuvastatin prescription to pharmacy, good Rx coupon given to patient         Relevant Medications    rosuvastatin (CRESTOR) 5 MG Tab    Hypovitaminosis D    Relevant Medications    vitamin D2 (ERGOCALCIFEROL) 1.25 mg (35454 Units) Cap capsule    Paresthesias    Chronic-not controlled  - We discussed that his neuropathy is likely related to his MS.  We also discussed possible sleep apnea         Snoring    Chronic    STOP-BANG Score for Obstructive Sleep Apnea     RESULT SUMMARY:  4 points  STOP-BANG    High   Risk for moderate to severe OLE      INPUTS:  Do you snore loudly? --> 1 = Yes  Do you often feel tired, fatigued, or sleepy during the daytime? --> 1 = Yes  Has anyone observed you stop breathing during sleep? --> 1 = Yes  Do you have (or are you being treated for) high blood pressure? --> 0 = No  BMI --> 0 = <=35 kg/m²  Age --> 0 = <=50 years  Neck circumference --> 0 = <=40 cm  Gender --> 1 = Male           Relevant Orders    Overnight Home Sleep Study     Other Visit Diagnoses         Benign prostatic hyperplasia with weak urinary stream        Relevant Medications    tamsulosin (FLOMAX) 0.4 MG capsule      Colon cancer screening        Relevant Orders    Cologuard® colon cancer screening             Health maintenance:    Labs per orders  Immunizations per orders  Age-appropriate guidance discussed including diet and exercise  Discussed colorectal cancer screening     Follow-up: 1 year annual          [1]   Social History  Tobacco Use    Smoking status: Never    Smokeless tobacco: Never   Vaping Use    Vaping status: Never Used   Substance Use Topics    Alcohol use: Not Currently    Drug use: Never   [2]   Current Outpatient Medications    Medication Sig Dispense Refill    vitamin D2 (ERGOCALCIFEROL) 1.25 mg (75447 Units) Cap capsule Take 1 Capsule by mouth every 7 days. 12 Capsule 2    tamsulosin (FLOMAX) 0.4 MG capsule Take 1 Capsule by mouth 1/2 hour after breakfast. 90 Capsule 1    rosuvastatin (CRESTOR) 5 MG Tab Take 1 Tablet by mouth every evening. 90 Tablet 3    Ofatumumab (KESIMPTA) 20 MG/0.4ML Solution Auto-injector Inject 20 mg under the skin every 30 (thirty) days. 0.56 mL 11    gabapentin (NEURONTIN) 300 MG Cap Take 1 Capsule by mouth 3 times a day for 360 days. 90 Capsule 11    pneumococcal 20-Amelia Conj Vacc (PREVNAR 20) 0.5 ML Suspension Prefilled Syringe syringe Inject 0.5 mL into the shoulder, thigh, or buttocks. 0.5 mL 0     No current facility-administered medications for this visit.   [3] Not on File

## 2025-06-10 NOTE — ASSESSMENT & PLAN NOTE
Chronic    STOP-BANG Score for Obstructive Sleep Apnea     RESULT SUMMARY:  4 points  STOP-BANG    High   Risk for moderate to severe OLE      INPUTS:  Do you snore loudly? --> 1 = Yes  Do you often feel tired, fatigued, or sleepy during the daytime? --> 1 = Yes  Has anyone observed you stop breathing during sleep? --> 1 = Yes  Do you have (or are you being treated for) high blood pressure? --> 0 = No  BMI --> 0 = <=35 kg/m²  Age --> 0 = <=50 years  Neck circumference --> 0 = <=40 cm  Gender --> 1 = Male

## 2025-06-10 NOTE — ASSESSMENT & PLAN NOTE
Chronic-not controlled  - We discussed that his neuropathy is likely related to his MS.  We also discussed possible sleep apnea

## 2025-06-13 ENCOUNTER — HOSPITAL ENCOUNTER (OUTPATIENT)
Dept: LAB | Facility: MEDICAL CENTER | Age: 65
End: 2025-06-13
Attending: STUDENT IN AN ORGANIZED HEALTH CARE EDUCATION/TRAINING PROGRAM
Payer: COMMERCIAL

## 2025-06-13 ENCOUNTER — HOSPITAL ENCOUNTER (OUTPATIENT)
Dept: LAB | Facility: MEDICAL CENTER | Age: 65
End: 2025-06-13
Attending: PSYCHIATRY & NEUROLOGY
Payer: COMMERCIAL

## 2025-06-13 DIAGNOSIS — G35 MULTIPLE SCLEROSIS (HCC): ICD-10-CM

## 2025-06-13 DIAGNOSIS — Z00.00 ANNUAL PHYSICAL EXAM: ICD-10-CM

## 2025-06-13 DIAGNOSIS — Z12.5 PROSTATE CANCER SCREENING: ICD-10-CM

## 2025-06-13 LAB
BASOPHILS # BLD AUTO: 0.7 % (ref 0–1.8)
BASOPHILS # BLD AUTO: 1 % (ref 0–1.8)
BASOPHILS # BLD: 0.05 K/UL (ref 0–0.12)
BASOPHILS # BLD: 0.07 K/UL (ref 0–0.12)
EOSINOPHIL # BLD AUTO: 0.12 K/UL (ref 0–0.51)
EOSINOPHIL # BLD AUTO: 0.12 K/UL (ref 0–0.51)
EOSINOPHIL NFR BLD: 1.7 % (ref 0–6.9)
EOSINOPHIL NFR BLD: 1.8 % (ref 0–6.9)
ERYTHROCYTE [DISTWIDTH] IN BLOOD BY AUTOMATED COUNT: 45 FL (ref 35.9–50)
ERYTHROCYTE [DISTWIDTH] IN BLOOD BY AUTOMATED COUNT: 45.4 FL (ref 35.9–50)
EST. AVERAGE GLUCOSE BLD GHB EST-MCNC: 131 MG/DL
HBA1C MFR BLD: 6.2 % (ref 4–5.6)
HCT VFR BLD AUTO: 45 % (ref 42–52)
HCT VFR BLD AUTO: 45.3 % (ref 42–52)
HGB BLD-MCNC: 14.8 G/DL (ref 14–18)
HGB BLD-MCNC: 15 G/DL (ref 14–18)
IMM GRANULOCYTES # BLD AUTO: 0.03 K/UL (ref 0–0.11)
IMM GRANULOCYTES # BLD AUTO: 0.05 K/UL (ref 0–0.11)
IMM GRANULOCYTES NFR BLD AUTO: 0.4 % (ref 0–0.9)
IMM GRANULOCYTES NFR BLD AUTO: 0.7 % (ref 0–0.9)
LYMPHOCYTES # BLD AUTO: 1.93 K/UL (ref 1–4.8)
LYMPHOCYTES # BLD AUTO: 1.95 K/UL (ref 1–4.8)
LYMPHOCYTES NFR BLD: 27.8 % (ref 22–41)
LYMPHOCYTES NFR BLD: 28.5 % (ref 22–41)
MCH RBC QN AUTO: 30.1 PG (ref 27–33)
MCH RBC QN AUTO: 30.1 PG (ref 27–33)
MCHC RBC AUTO-ENTMCNC: 32.9 G/DL (ref 32.3–36.5)
MCHC RBC AUTO-ENTMCNC: 33.1 G/DL (ref 32.3–36.5)
MCV RBC AUTO: 90.8 FL (ref 81.4–97.8)
MCV RBC AUTO: 91.6 FL (ref 81.4–97.8)
MONOCYTES # BLD AUTO: 0.85 K/UL (ref 0–0.85)
MONOCYTES # BLD AUTO: 0.89 K/UL (ref 0–0.85)
MONOCYTES NFR BLD AUTO: 12.3 % (ref 0–13.4)
MONOCYTES NFR BLD AUTO: 13 % (ref 0–13.4)
NEUTROPHILS # BLD AUTO: 3.76 K/UL (ref 1.82–7.42)
NEUTROPHILS # BLD AUTO: 3.95 K/UL (ref 1.82–7.42)
NEUTROPHILS NFR BLD: 55 % (ref 44–72)
NEUTROPHILS NFR BLD: 57.1 % (ref 44–72)
NRBC # BLD AUTO: 0 K/UL
NRBC # BLD AUTO: 0 K/UL
NRBC BLD-RTO: 0 /100 WBC (ref 0–0.2)
NRBC BLD-RTO: 0 /100 WBC (ref 0–0.2)
PLATELET # BLD AUTO: 251 K/UL (ref 164–446)
PLATELET # BLD AUTO: 260 K/UL (ref 164–446)
PMV BLD AUTO: 11.4 FL (ref 9–12.9)
PMV BLD AUTO: 11.5 FL (ref 9–12.9)
RBC # BLD AUTO: 4.91 M/UL (ref 4.7–6.1)
RBC # BLD AUTO: 4.99 M/UL (ref 4.7–6.1)
WBC # BLD AUTO: 6.8 K/UL (ref 4.8–10.8)
WBC # BLD AUTO: 6.9 K/UL (ref 4.8–10.8)

## 2025-06-13 PROCEDURE — 86357 NK CELLS TOTAL COUNT: CPT

## 2025-06-13 PROCEDURE — 83036 HEMOGLOBIN GLYCOSYLATED A1C: CPT

## 2025-06-13 PROCEDURE — 82784 ASSAY IGA/IGD/IGG/IGM EACH: CPT

## 2025-06-13 PROCEDURE — 85025 COMPLETE CBC W/AUTO DIFF WBC: CPT | Mod: 91

## 2025-06-13 PROCEDURE — 80076 HEPATIC FUNCTION PANEL: CPT

## 2025-06-13 PROCEDURE — 86359 T CELLS TOTAL COUNT: CPT

## 2025-06-13 PROCEDURE — 86360 T CELL ABSOLUTE COUNT/RATIO: CPT

## 2025-06-13 PROCEDURE — 80053 COMPREHEN METABOLIC PANEL: CPT

## 2025-06-13 PROCEDURE — 84153 ASSAY OF PSA TOTAL: CPT

## 2025-06-13 PROCEDURE — 85025 COMPLETE CBC W/AUTO DIFF WBC: CPT

## 2025-06-13 PROCEDURE — 36415 COLL VENOUS BLD VENIPUNCTURE: CPT

## 2025-06-13 PROCEDURE — 82306 VITAMIN D 25 HYDROXY: CPT

## 2025-06-13 PROCEDURE — 86355 B CELLS TOTAL COUNT: CPT

## 2025-06-13 PROCEDURE — 84443 ASSAY THYROID STIM HORMONE: CPT

## 2025-06-13 PROCEDURE — 80061 LIPID PANEL: CPT

## 2025-06-13 NOTE — Clinical Note
REFERRAL APPROVAL NOTICE         Sent on June 13, 2025                   Brian Hogan  [address] [place] [address]                   Dear Wilton Hogan,    After a careful review of the medical information and benefit coverage, [facility] has processed your referral. See below for additional details.    If applicable, you must be actively enrolled with your insurance for coverage of the authorized service. If you have any questions regarding your coverage, please contact your insurance directly.    REFERRAL INFORMATION   Referral #:  [id number]  Referred-To Department    Referred-By Provider:  Pulmonary and Sleep Medicine    Yonas Chou D.O.   [facility]      [address]  [place] [id number] [phone] [address]  [place] [address] [phone]    Referral Start Date:  06/13/2025  Referral End Date:   08/11/2025             SCHEDULING  If you do not already have an appointment, please call [phone] to make an appointment.     MORE INFORMATION  If you do not already have a [employer] account, sign up at: [email or web address]  You can access your medical information, make appointments, see lab results, billing information, and more.  If you have questions regarding this referral, please contact  the [facility] Referrals department at:             [phone]. Monday - Friday 8:00AM - 5:00PM.     Sincerely,    [facility]

## 2025-06-14 LAB
25(OH)D3 SERPL-MCNC: 42 NG/ML (ref 30–100)
ALBUMIN SERPL BCP-MCNC: 4 G/DL (ref 3.2–4.9)
ALBUMIN SERPL BCP-MCNC: 4 G/DL (ref 3.2–4.9)
ALBUMIN/GLOB SERPL: 1.3 G/DL
ALP SERPL-CCNC: 78 U/L (ref 30–99)
ALP SERPL-CCNC: 80 U/L (ref 30–99)
ALT SERPL-CCNC: 23 U/L (ref 2–50)
ALT SERPL-CCNC: 23 U/L (ref 2–50)
ANION GAP SERPL CALC-SCNC: 11 MMOL/L (ref 7–16)
AST SERPL-CCNC: 28 U/L (ref 12–45)
AST SERPL-CCNC: 29 U/L (ref 12–45)
BILIRUB CONJ SERPL-MCNC: <0.2 MG/DL (ref 0.1–0.5)
BILIRUB INDIRECT SERPL-MCNC: NORMAL MG/DL (ref 0–1)
BILIRUB SERPL-MCNC: 0.3 MG/DL (ref 0.1–1.5)
BILIRUB SERPL-MCNC: 0.3 MG/DL (ref 0.1–1.5)
BUN SERPL-MCNC: 13 MG/DL (ref 8–22)
CALCIUM ALBUM COR SERPL-MCNC: 8.8 MG/DL (ref 8.5–10.5)
CALCIUM SERPL-MCNC: 8.8 MG/DL (ref 8.5–10.5)
CHLORIDE SERPL-SCNC: 105 MMOL/L (ref 96–112)
CHOLEST SERPL-MCNC: 245 MG/DL (ref 100–199)
CO2 SERPL-SCNC: 23 MMOL/L (ref 20–33)
CREAT SERPL-MCNC: 0.91 MG/DL (ref 0.5–1.4)
FASTING STATUS PATIENT QL REPORTED: NORMAL
GFR SERPLBLD CREATININE-BSD FMLA CKD-EPI: 95 ML/MIN/1.73 M 2
GLOBULIN SER CALC-MCNC: 3.1 G/DL (ref 1.9–3.5)
GLUCOSE SERPL-MCNC: 100 MG/DL (ref 65–99)
HDLC SERPL-MCNC: 42 MG/DL
LDLC SERPL CALC-MCNC: 188 MG/DL
POTASSIUM SERPL-SCNC: 4.1 MMOL/L (ref 3.6–5.5)
PROT SERPL-MCNC: 7.1 G/DL (ref 6–8.2)
PROT SERPL-MCNC: 7.1 G/DL (ref 6–8.2)
PSA SERPL DL<=0.01 NG/ML-MCNC: 0.63 NG/ML (ref 0–4)
SODIUM SERPL-SCNC: 139 MMOL/L (ref 135–145)
TRIGL SERPL-MCNC: 76 MG/DL (ref 0–149)
TSH SERPL DL<=0.005 MIU/L-ACNC: 1.94 UIU/ML (ref 0.38–5.33)

## 2025-06-15 LAB
ANNOTATION COMMENT IMP: ABNORMAL
CD19 CELLS NFR SPEC: 0 % (ref 6–23)
CD3 CELLS # BLD: 1705 CELLS/UL (ref 570–2400)
CD3 CELLS NFR SPEC: 83 % (ref 62–87)
CD3+CD4+ CELLS # BLD: 1523 CELLS/UL (ref 430–1800)
CD3+CD4+ CELLS NFR BLD: 74 % (ref 32–64)
CD3+CD4+ CELLS/CD3+CD8+ CLL BLD: 9.25 RATIO (ref 0.8–3.9)
CD3+CD8+ CELLS # BLD: 176 CELLS/UL (ref 210–1200)
CD3+CD8+ CELLS NFR SPEC: 8 % (ref 15–46)
CD3-CD16+CD56+ CELLS # SPEC: 351 CELLS/UL (ref 78–470)
CD3-CD16+CD56+ CELLS NFR SPEC: 17 % (ref 4–26)
CELLS.CD3-CD19+ [#/VOLUME] IN BLOOD: 1 CELLS/UL (ref 91–610)
IGA SERPL-MCNC: 319 MG/DL (ref 68–408)
IGG SERPL-MCNC: 1133 MG/DL (ref 768–1632)
IGM SERPL-MCNC: 56 MG/DL (ref 35–263)

## 2025-06-16 ENCOUNTER — RESULTS FOLLOW-UP (OUTPATIENT)
Dept: MEDICAL GROUP | Facility: LAB | Age: 65
End: 2025-06-16

## 2025-06-19 ENCOUNTER — OFFICE VISIT (OUTPATIENT)
Dept: NEUROLOGY | Facility: MEDICAL CENTER | Age: 65
End: 2025-06-19
Attending: PSYCHIATRY & NEUROLOGY
Payer: COMMERCIAL

## 2025-06-19 VITALS
OXYGEN SATURATION: 96 % | HEART RATE: 61 BPM | BODY MASS INDEX: 36.39 KG/M2 | RESPIRATION RATE: 14 BRPM | WEIGHT: 259.92 LBS | TEMPERATURE: 98.2 F | HEIGHT: 71 IN | SYSTOLIC BLOOD PRESSURE: 128 MMHG | DIASTOLIC BLOOD PRESSURE: 74 MMHG

## 2025-06-19 DIAGNOSIS — G35 MULTIPLE SCLEROSIS (HCC): Primary | ICD-10-CM

## 2025-06-19 PROCEDURE — 3074F SYST BP LT 130 MM HG: CPT | Performed by: PSYCHIATRY & NEUROLOGY

## 2025-06-19 PROCEDURE — 99214 OFFICE O/P EST MOD 30 MIN: CPT | Performed by: PSYCHIATRY & NEUROLOGY

## 2025-06-19 PROCEDURE — 3078F DIAST BP <80 MM HG: CPT | Performed by: PSYCHIATRY & NEUROLOGY

## 2025-06-19 PROCEDURE — 99213 OFFICE O/P EST LOW 20 MIN: CPT | Performed by: PSYCHIATRY & NEUROLOGY

## 2025-06-19 ASSESSMENT — FIBROSIS 4 INDEX: FIB4 SCORE: 1.44

## 2025-06-19 ASSESSMENT — PATIENT HEALTH QUESTIONNAIRE - PHQ9: CLINICAL INTERPRETATION OF PHQ2 SCORE: 0

## 2025-06-19 NOTE — PROGRESS NOTES
"[facility] NEUROLOGY  MULTIPLE SCLEROSIS & NEUROIMMUNOLOGY  FOLLOW-UP VISIT    DISEASE SUMMARY:  Principal neurologic diagnosis: MS  Diagnosis of MS: 2016  Disease History:  - : episode of ?left optic neuritis  - 2015: episode of numbness with a sensory level at the level of the diaphragm; resolved spontaneously after ~1 week  - 2016: second episode of numbness with same sensory level as previous, resolved spontaneously  - established w/ Dr. Yoo; workup included MRIs and LP; diagnosed w/ MS  - established w/ Dr. Lancaster (MS specialist)  - 2017: started Tecfidera;   - 2017: stopped Tecfidera due to perceived ineffectiveness (progression)  - 2018: started Ocrevus, treated x1 year  - treated w/ Acthar Gel  - 2019: transitioned to Tysabri due to \"no change or improvement\" on Ocrevus  - : required a cane  - Dr. Lancaster , eventually transitioned to Dr. Mayra Mccrary ([facility])  - : started using a walker occasionally  - : transitioned to Kesimpta due to \"no change or improvement\" on Tysabri  - : got a power chair due to fatigability while walking with walker  Disease course at onset: ?relapsing or progressive  Current disease course: progressive (?primary or secondary) without activity without worsening  Previous disease therapies:  - Tecfidera:   - Ocrevus  - Tysabri  Current disease therapies:  - Kesimpta: tolerates this well  Symptomatic therapies:  - Ampyra: most likely ineffective, didn't notice he was worse after stopping the drug  - Flomax: minimally helpful for nocturia  - gabapentin: 300 mg TID was ineffective, no side effects  CSF ():  - reportedly consistent with MS  Other Testing:  - EMG/NCS (2024): \"right CTS which is mild to moderate...\"  MRI head:  - 2023: \"unchanged white matter lesions... no new lesion... no lesions enhance...\"  - 2022:   - 2021: \"no actively enhancing lesions... no definite interval change...\"  - 2019:   MRI cervical spine:  - " "4/11/2023: \"unchanged cord lesions... no new lesions... no enhancing lesions...\"  - 11/16/2021: \"no actively enhancing plaques... no definite interval change...\"  - 1/16/2019: \"MS disease burden is moderate throughout the cervical cord... no actively enhancing plaques...\"  MRI thoracic spine:  - 4/11/2023: \"unchanged cord lesions... no new lesion...\"  - 7/27/2022: \"unchanged...\"  - 11/16/2021: \"no definite interval change... no actively enhancing plaques...\"  - 1/16/2019: \"chronic MS disease burden is moderate from T1 through T5... no enhancing plaques...\"  Immunizations:  - influenza?:   - Pneumonia?: Prevnar 20 on 12/12/2024  - SARS-CoV-2?: original Moderna in 2020  Cancer Screens:  - mammogram:   - PAP?:   - skin check:     CC: progressive MS without activity with worsening    INTERVAL HISTORY:  Brian Hogan is a 64 y.o. man with progressive MS without activity with worsening.  I last saw him in the [facility] on 12/12/2024.  At that time I recommended he continue Kesimpta, obtain drug monitoring labs, repeat imaging, and try gabapentin.  Today, he was accompanied by his wife, and he provided the following interval history:    I have summarized interval data above.    A review of MS-related symptoms was notable for the following:    Fatigue:   Weakness:   Numbness:   Incoordination:   Spasms/Spasticity:   Vision Impairment:   Walking/Balance Problems: worse, he can't move as fast, carrying out transfers is more difficult, his wife has to assist by lifting his leg to get into the car or into the house  Neuralgia: yes, present in the lower extremities, not \"painful,\" but irritating  Bowel Symptoms:   Bladder Symptoms:   Heat Sensitivity:   Depression:   Cognitive/Memory Problems:   Sexual Dysfunction:   Anxiety:     He continues on Kesimpta.  He tolerates the injections well without side effects.  He has not developed any severe infections.    MEDICATIONS:  Current Outpatient Medications   Medication Sig    vitamin " D2 (ERGOCALCIFEROL) 1.25 mg (47324 Units) Cap capsule Take 1 Capsule by mouth every 7 days.    tamsulosin (FLOMAX) 0.4 MG capsule Take 1 Capsule by mouth 1/2 hour after breakfast.    rosuvastatin (CRESTOR) 5 MG Tab Take 1 Tablet by mouth every evening.    Ofatumumab (KESIMPTA) 20 MG/0.4ML Solution Auto-injector Inject 20 mg under the skin every 30 (thirty) days.     MEDICAL, SOCIAL, AND FAMILY HISTORY:  There is no change in the patient's ROS or medical, social, or family histories since the previous visit on 5/16/2024.    REVIEW OF SYSTEMS:  A ROS was completed.  Pertinent positives and negatives were included in the HPI, above.  All other systems were reviewed and are negative.    PHYSICAL EXAM:  General/Medical:  - NAD  - seated in motorized wheel chair    Neuro:  MENTAL STATUS: awake and alert; no deficits of speech or language; oriented to conversation; affect was appropriate to situation; pleasant, cooperative    CRANIAL NERVES:    II: acuity: NT, fields: NT, pupils: NT, discs: NT    III/IV/VI: versions: grossly intact    V: facial sensation: NT    VII: facial expression: symmetric    VIII: hearing: intact to voice    IX/X: palate: NT    XI: shoulder shrug: NT    XII: tongue: NT    MOTOR:  - bulk: NT  - tone: NT  Upper Extremity Strength (R/L)    NT   Elbow flexion NT   Elbow extension NT   Shoulder abduction NT     Lower Extremity Strength  (R/L)   Hip flexion <3/<3   Knee extension 3/3   Knee flexion NT   Ankle dorsiflexion NT   Ankle plantarflexion NT     - pronator drift: NT  - abnormal movements: none    SENSATION:  - light touch: NT  - vibration (R/L, seconds): NT at the great toes  - pinprick: NT  - proprioception: NT  - Romberg: NT    COORDINATION:  - finger to nose: NT  - finger tapping: NT    REFLEXES:  Reflex Right Left   BR NT NT   Biceps NT NT   Triceps NT NT   Patellae NT NT   Achilles NT NT   Toes NT NT     GAIT:  - motorized wheel chair    QUANTITATIVE SCORES:  Timed 25-foot walk (sec): NT  "today (21.0 on 12/12/2024, 17.0 on 1/17/2024).  Assistive device: motorized wheel chair    REVIEW OF IMAGING STUDIES:  No additional data since the last visit.    REVIEW OF LABORATORY STUDIES:  6/13/2025:  - lymphocyte subsets: CD19 %/Ct: 0/1  - immunoglobulins: WNL    ASSESSMENT:  Brian Hogan is a 64 y.o. man with MS.  He continues on Kesimpta but continues to worsen.  This is likely due to secondary progressive disease.  We discussed the possibility of transitioning to tolebrutinib if/when this is approved.    PLAN:  MS:  - continue Kesimpta for now    Follow-Up:  - Return in about 6 months (around 12/19/2025).    Signed: Deandre Hull M.D.    BILLING DOCUMENTATION:   I spent 31 minutes reviewing the medical record, interviewing and examining the patient, discussing my impression (see \"assessment\" above), and coordinating care.  "

## 2025-06-20 ENCOUNTER — TELEMEDICINE (OUTPATIENT)
Dept: MEDICAL GROUP | Facility: LAB | Age: 65
End: 2025-06-20
Payer: COMMERCIAL

## 2025-06-20 DIAGNOSIS — E78.5 DYSLIPIDEMIA: Primary | ICD-10-CM

## 2025-06-20 DIAGNOSIS — R73.03 PREDIABETES: ICD-10-CM

## 2025-06-20 PROCEDURE — 99214 OFFICE O/P EST MOD 30 MIN: CPT | Mod: 95 | Performed by: STUDENT IN AN ORGANIZED HEALTH CARE EDUCATION/TRAINING PROGRAM

## 2025-06-20 ASSESSMENT — ENCOUNTER SYMPTOMS
CHILLS: 0
SHORTNESS OF BREATH: 0
FEVER: 0

## 2025-06-20 NOTE — ASSESSMENT & PLAN NOTE
Chronic-worsened  - Discussed with patient to decrease his carbohydrates to 100-120g per day with dinner being very low carbs in general

## 2025-06-20 NOTE — PROGRESS NOTES
Subjective:   This evaluation was conducted via teams using secure and encrypted videoconferencing technology. The patient was in a private location in the [facility].    The patient's identity was confirmed and verbal consent was obtained for this virtual visit.    CC:   Chief Complaint   Patient presents with    Results        HPI:   History of Present Illness         Problem   Prediabetes    A1c is increased from 5.8-6.2, patient is limited in his exercise due to his MS     Dyslipidemia      Lab Results   Component Value Date/Time    CHOLSTRLTOT 304 (H) 01/23/2024 09:33 AM     (H) 01/23/2024 09:33 AM    HDL 52 01/23/2024 09:33 AM    TRIGLYCERIDE 69 01/23/2024 09:33 AM     8/1/2024  - Previously patient was started on rosuvastatin 5 mg and is tolerating well  Lab Results   Component Value Date/Time    CHOLSTRLTOT 168 05/24/2024 09:12 AM     (H) 05/24/2024 09:12 AM    HDL 49 05/24/2024 09:12 AM    TRIGLYCERIDE 52 05/24/2024 09:12 AM       6/10/2025  - Patient has had issues with picking up his cholesterol medication due to insurance confusion             Current Medications and Prescriptions Ordered in Epic[1]        ROS:  Review of Systems   Constitutional:  Negative for chills and fever.   Respiratory:  Negative for shortness of breath.    Cardiovascular:  Negative for chest pain.       Objective:     Exam:  There were no vitals taken for this visit. There is no height or weight on file to calculate BMI.    Physical Exam  Constitutional:       General: He is not in acute distress.     Appearance: He is not ill-appearing.   Pulmonary:      Effort: Pulmonary effort is normal.   Neurological:      Mental Status: He is alert.   Psychiatric:         Mood and Affect: Mood normal.         Behavior: Behavior normal.         Thought Content: Thought content normal.         Judgment: Judgment normal.           Assessment & Plan:     Problem List Items Addressed This Visit       Dyslipidemia - Primary     Chronic  -Patient has picked up his rosuvastatin and has started taking  - Recheck in 6 months         Relevant Orders    Lipid Profile    Prediabetes    Chronic-worsened  - Discussed with patient to decrease his carbohydrates to 100-120g per day with dinner being very low carbs in general         Relevant Orders    HEMOGLOBIN A1C    Comp Metabolic Panel         3+ labs reviewed      Please note that this dictation was created using voice recognition software. I have made every reasonable attempt to correct obvious errors, but I expect that there are errors of grammar and possibly content that I did not discover before finalizing the note.          [1]   Current Outpatient Medications Ordered in Epic   Medication Sig Dispense Refill    vitamin D2 (ERGOCALCIFEROL) 1.25 mg (73100 Units) Cap capsule Take 1 Capsule by mouth every 7 days. 12 Capsule 2    tamsulosin (FLOMAX) 0.4 MG capsule Take 1 Capsule by mouth 1/2 hour after breakfast. 90 Capsule 1    rosuvastatin (CRESTOR) 5 MG Tab Take 1 Tablet by mouth every evening. 90 Tablet 3    Ofatumumab (KESIMPTA) 20 MG/0.4ML Solution Auto-injector Inject 20 mg under the skin every 30 (thirty) days. 0.56 mL 11     No current [facility]-ordered facility-administered medications on file.

## 2025-06-30 ENCOUNTER — APPOINTMENT (OUTPATIENT)
Dept: MEDICAL GROUP | Facility: LAB | Age: 65
End: 2025-06-30
Payer: COMMERCIAL

## 2025-07-02 LAB — NONINV COLON CA DNA+OCC BLD SCRN STL QL: NEGATIVE
